# Patient Record
Sex: FEMALE | Race: ASIAN
[De-identification: names, ages, dates, MRNs, and addresses within clinical notes are randomized per-mention and may not be internally consistent; named-entity substitution may affect disease eponyms.]

---

## 2017-03-13 ENCOUNTER — RECORDS - HEALTHEAST (OUTPATIENT)
Dept: ADMINISTRATIVE | Facility: OTHER | Age: 32
End: 2017-03-13

## 2017-06-27 ENCOUNTER — COMMUNICATION - HEALTHEAST (OUTPATIENT)
Dept: FAMILY MEDICINE | Facility: CLINIC | Age: 32
End: 2017-06-27

## 2017-07-20 ENCOUNTER — OFFICE VISIT - HEALTHEAST (OUTPATIENT)
Dept: FAMILY MEDICINE | Facility: CLINIC | Age: 32
End: 2017-07-20

## 2017-07-20 DIAGNOSIS — N97.0 INFERTILITY ASSOCIATED WITH ANOVULATION: ICD-10-CM

## 2017-07-20 DIAGNOSIS — E28.2 PCOS (POLYCYSTIC OVARIAN SYNDROME): ICD-10-CM

## 2017-07-22 LAB — 17-HYDROXYPROGESTERONE, S: 47 NG/DL

## 2017-07-31 ENCOUNTER — COMMUNICATION - HEALTHEAST (OUTPATIENT)
Dept: FAMILY MEDICINE | Facility: CLINIC | Age: 32
End: 2017-07-31

## 2017-08-03 ENCOUNTER — COMMUNICATION - HEALTHEAST (OUTPATIENT)
Dept: FAMILY MEDICINE | Facility: CLINIC | Age: 32
End: 2017-08-03

## 2017-08-03 DIAGNOSIS — Z00.00 HEALTHCARE MAINTENANCE: ICD-10-CM

## 2017-08-30 ENCOUNTER — COMMUNICATION - HEALTHEAST (OUTPATIENT)
Dept: FAMILY MEDICINE | Facility: CLINIC | Age: 32
End: 2017-08-30

## 2017-09-08 ENCOUNTER — OFFICE VISIT - HEALTHEAST (OUTPATIENT)
Dept: FAMILY MEDICINE | Facility: CLINIC | Age: 32
End: 2017-09-08

## 2017-09-08 DIAGNOSIS — N92.6 MISSED MENSES: ICD-10-CM

## 2017-09-10 ENCOUNTER — COMMUNICATION - HEALTHEAST (OUTPATIENT)
Dept: FAMILY MEDICINE | Facility: CLINIC | Age: 32
End: 2017-09-10

## 2017-09-15 ENCOUNTER — COMMUNICATION - HEALTHEAST (OUTPATIENT)
Dept: SCHEDULING | Facility: CLINIC | Age: 32
End: 2017-09-15

## 2017-09-19 ENCOUNTER — COMMUNICATION - HEALTHEAST (OUTPATIENT)
Dept: SCHEDULING | Facility: CLINIC | Age: 32
End: 2017-09-19

## 2017-09-25 ENCOUNTER — COMMUNICATION - HEALTHEAST (OUTPATIENT)
Dept: SCHEDULING | Facility: CLINIC | Age: 32
End: 2017-09-25

## 2017-09-25 ENCOUNTER — OFFICE VISIT - HEALTHEAST (OUTPATIENT)
Dept: FAMILY MEDICINE | Facility: CLINIC | Age: 32
End: 2017-09-25

## 2017-09-25 DIAGNOSIS — J02.9 SORE THROAT: ICD-10-CM

## 2017-09-25 DIAGNOSIS — R05.9 COUGH: ICD-10-CM

## 2017-09-25 DIAGNOSIS — Z34.90 PREGNANT: ICD-10-CM

## 2017-09-25 ASSESSMENT — MIFFLIN-ST. JEOR: SCORE: 1358.78

## 2017-09-27 ENCOUNTER — HOSPITAL ENCOUNTER (OUTPATIENT)
Dept: ULTRASOUND IMAGING | Facility: HOSPITAL | Age: 32
Discharge: HOME | End: 2017-09-27
Attending: NURSE PRACTITIONER

## 2017-10-03 ENCOUNTER — COMMUNICATION - HEALTHEAST (OUTPATIENT)
Dept: SCHEDULING | Facility: CLINIC | Age: 32
End: 2017-10-03

## 2017-10-16 ENCOUNTER — RECORDS - HEALTHEAST (OUTPATIENT)
Dept: ADMINISTRATIVE | Facility: OTHER | Age: 32
End: 2017-10-16

## 2017-10-27 ENCOUNTER — PRENATAL OFFICE VISIT - HEALTHEAST (OUTPATIENT)
Dept: FAMILY MEDICINE | Facility: CLINIC | Age: 32
End: 2017-10-27

## 2017-10-27 DIAGNOSIS — B18.1 HEPATITIS B CARRIER (H): ICD-10-CM

## 2017-10-27 DIAGNOSIS — Z34.90 PREGNANCY: ICD-10-CM

## 2017-10-27 LAB
HBV SURFACE AG SERPL QL IA: NEGATIVE
HIV 1+2 AB+HIV1 P24 AG SERPL QL IA: NEGATIVE

## 2017-10-30 LAB
HBV CORE AB SERPL QL IA: POSITIVE
HEPATITIS B SURFACE ANTIBODY LHE- HISTORICAL: POSITIVE
HEPATITIS B SURFACE ANTIBODY LHE- HISTORICAL: POSITIVE
SYPHILIS RPR SCREEN - HISTORICAL: NORMAL

## 2017-11-26 ENCOUNTER — COMMUNICATION - HEALTHEAST (OUTPATIENT)
Dept: FAMILY MEDICINE | Facility: CLINIC | Age: 32
End: 2017-11-26

## 2017-12-01 ENCOUNTER — PRENATAL OFFICE VISIT - HEALTHEAST (OUTPATIENT)
Dept: FAMILY MEDICINE | Facility: CLINIC | Age: 32
End: 2017-12-01

## 2017-12-01 DIAGNOSIS — Z34.90 PREGNANCY: ICD-10-CM

## 2018-01-12 ENCOUNTER — COMMUNICATION - HEALTHEAST (OUTPATIENT)
Dept: FAMILY MEDICINE | Facility: CLINIC | Age: 33
End: 2018-01-12

## 2018-01-16 ENCOUNTER — COMMUNICATION - HEALTHEAST (OUTPATIENT)
Dept: FAMILY MEDICINE | Facility: CLINIC | Age: 33
End: 2018-01-16

## 2018-01-22 ENCOUNTER — COMMUNICATION - HEALTHEAST (OUTPATIENT)
Dept: FAMILY MEDICINE | Facility: CLINIC | Age: 33
End: 2018-01-22

## 2018-01-30 ENCOUNTER — COMMUNICATION - HEALTHEAST (OUTPATIENT)
Dept: FAMILY MEDICINE | Facility: CLINIC | Age: 33
End: 2018-01-30

## 2018-02-18 ENCOUNTER — COMMUNICATION - HEALTHEAST (OUTPATIENT)
Dept: FAMILY MEDICINE | Facility: CLINIC | Age: 33
End: 2018-02-18

## 2018-02-20 ENCOUNTER — COMMUNICATION - HEALTHEAST (OUTPATIENT)
Dept: OBGYN | Facility: HOSPITAL | Age: 33
End: 2018-02-20

## 2018-02-20 ENCOUNTER — COMMUNICATION - HEALTHEAST (OUTPATIENT)
Dept: FAMILY MEDICINE | Facility: CLINIC | Age: 33
End: 2018-02-20

## 2018-02-21 ENCOUNTER — COMMUNICATION - HEALTHEAST (OUTPATIENT)
Dept: FAMILY MEDICINE | Facility: CLINIC | Age: 33
End: 2018-02-21

## 2018-02-22 ENCOUNTER — PRENATAL OFFICE VISIT - HEALTHEAST (OUTPATIENT)
Dept: FAMILY MEDICINE | Facility: CLINIC | Age: 33
End: 2018-02-22

## 2018-02-22 DIAGNOSIS — N32.81 OVERACTIVE BLADDER: ICD-10-CM

## 2018-02-22 DIAGNOSIS — Z34.90 PREGNANCY: ICD-10-CM

## 2018-02-22 DIAGNOSIS — R10.32 LLQ ABDOMINAL PAIN: ICD-10-CM

## 2018-02-22 LAB
ALBUMIN UR-MCNC: ABNORMAL MG/DL
APPEARANCE UR: CLEAR
BACTERIA #/AREA URNS HPF: ABNORMAL HPF
BILIRUB UR QL STRIP: NEGATIVE
CAOX CRY #/AREA URNS HPF: PRESENT /[HPF]
COLOR UR AUTO: YELLOW
CREAT UR-MCNC: 139.9 MG/DL
FASTING STATUS PATIENT QL REPORTED: NO
GLUCOSE 1H P 50 G GLC PO SERPL-MCNC: 165 MG/DL (ref 70–139)
GLUCOSE UR STRIP-MCNC: NEGATIVE MG/DL
HGB BLD-MCNC: 11.9 G/DL (ref 12–16)
HGB UR QL STRIP: ABNORMAL
KETONES UR STRIP-MCNC: NEGATIVE MG/DL
LEUKOCYTE ESTERASE UR QL STRIP: ABNORMAL
NITRATE UR QL: NEGATIVE
PH UR STRIP: 7 [PH] (ref 5–8)
PROTEIN, RANDOM URINE - HISTORICAL: 49 MG/DL
PROTEIN/CREAT RATIO, RANDOM UR: 0.35
RBC #/AREA URNS AUTO: ABNORMAL HPF
SP GR UR STRIP: 1.02 (ref 1–1.03)
SQUAMOUS #/AREA URNS AUTO: ABNORMAL LPF
UROBILINOGEN UR STRIP-ACNC: ABNORMAL
WBC #/AREA URNS AUTO: ABNORMAL HPF

## 2018-02-23 LAB
BACTERIA SPEC CULT: NO GROWTH
SYPHILIS RPR SCREEN - HISTORICAL: NORMAL

## 2018-02-24 ENCOUNTER — AMBULATORY - HEALTHEAST (OUTPATIENT)
Dept: FAMILY MEDICINE | Facility: CLINIC | Age: 33
End: 2018-02-24

## 2018-02-24 DIAGNOSIS — Z34.90 PREGNANCY: ICD-10-CM

## 2018-02-24 DIAGNOSIS — R80.9 PROTEINURIA: ICD-10-CM

## 2018-03-02 ENCOUNTER — AMBULATORY - HEALTHEAST (OUTPATIENT)
Dept: LAB | Facility: CLINIC | Age: 33
End: 2018-03-02

## 2018-03-02 DIAGNOSIS — R80.9 PROTEINURIA: ICD-10-CM

## 2018-03-02 DIAGNOSIS — Z34.90 PREGNANCY: ICD-10-CM

## 2018-03-02 LAB
CREAT UR-MCNC: 145.2 MG/DL
FASTING STATUS PATIENT QL REPORTED: YES
GLUCOSE 1H P 100 G GLC PO SERPL-MCNC: 157 MG/DL
GLUCOSE 2H P 100 G GLC PO SERPL-MCNC: 172 MG/DL
GLUCOSE 3H P 100 G GLC PO SERPL-MCNC: 159 MG/DL
GLUCOSE P FAST SERPL-MCNC: 83 MG/DL
PROTEIN, RANDOM URINE - HISTORICAL: 26 MG/DL
PROTEIN/CREAT RATIO, RANDOM UR: 0.18

## 2018-03-05 ENCOUNTER — COMMUNICATION - HEALTHEAST (OUTPATIENT)
Dept: FAMILY MEDICINE | Facility: CLINIC | Age: 33
End: 2018-03-05

## 2018-03-05 DIAGNOSIS — O24.419 GDM (GESTATIONAL DIABETES MELLITUS): ICD-10-CM

## 2018-03-08 ENCOUNTER — COMMUNICATION - HEALTHEAST (OUTPATIENT)
Dept: FAMILY MEDICINE | Facility: CLINIC | Age: 33
End: 2018-03-08

## 2018-03-13 ENCOUNTER — OFFICE VISIT - HEALTHEAST (OUTPATIENT)
Dept: EDUCATION SERVICES | Facility: CLINIC | Age: 33
End: 2018-03-13

## 2018-03-13 DIAGNOSIS — O24.419 GESTATIONAL DIABETES: ICD-10-CM

## 2018-03-22 ENCOUNTER — PRENATAL OFFICE VISIT - HEALTHEAST (OUTPATIENT)
Dept: FAMILY MEDICINE | Facility: CLINIC | Age: 33
End: 2018-03-22

## 2018-03-22 DIAGNOSIS — R53.83 FATIGUE: ICD-10-CM

## 2018-03-22 DIAGNOSIS — Z34.90 PREGNANCY: ICD-10-CM

## 2018-03-22 DIAGNOSIS — O26.899 LOW BACK PAIN DURING PREGNANCY: ICD-10-CM

## 2018-03-22 DIAGNOSIS — O24.419 GDM (GESTATIONAL DIABETES MELLITUS): ICD-10-CM

## 2018-03-22 DIAGNOSIS — M54.50 LOW BACK PAIN DURING PREGNANCY: ICD-10-CM

## 2018-03-22 LAB
ALBUMIN UR-MCNC: ABNORMAL MG/DL
APPEARANCE UR: ABNORMAL
BACTERIA #/AREA URNS HPF: ABNORMAL HPF
BILIRUB UR QL STRIP: NEGATIVE
COLOR UR AUTO: ABNORMAL
GLUCOSE UR STRIP-MCNC: NEGATIVE MG/DL
HGB UR QL STRIP: ABNORMAL
KETONES UR STRIP-MCNC: NEGATIVE MG/DL
LEUKOCYTE ESTERASE UR QL STRIP: ABNORMAL
MUCOUS THREADS #/AREA URNS LPF: ABNORMAL LPF
NITRATE UR QL: NEGATIVE
PH UR STRIP: 7 [PH] (ref 5–8)
RBC #/AREA URNS AUTO: ABNORMAL HPF
SP GR UR STRIP: 1.02 (ref 1–1.03)
SQUAMOUS #/AREA URNS AUTO: >100 LPF
UROBILINOGEN UR STRIP-ACNC: ABNORMAL
WBC #/AREA URNS AUTO: ABNORMAL HPF

## 2018-03-23 LAB — BACTERIA SPEC CULT: NO GROWTH

## 2018-03-29 ENCOUNTER — COMMUNICATION - HEALTHEAST (OUTPATIENT)
Dept: FAMILY MEDICINE | Facility: CLINIC | Age: 33
End: 2018-03-29

## 2018-03-30 ENCOUNTER — PRENATAL OFFICE VISIT - HEALTHEAST (OUTPATIENT)
Dept: FAMILY MEDICINE | Facility: CLINIC | Age: 33
End: 2018-03-30

## 2018-03-30 DIAGNOSIS — O24.419 GDM (GESTATIONAL DIABETES MELLITUS): ICD-10-CM

## 2018-03-30 DIAGNOSIS — F41.9 ANXIOUSNESS: ICD-10-CM

## 2018-03-30 DIAGNOSIS — Z34.90 PREGNANCY: ICD-10-CM

## 2018-03-30 LAB
ALBUMIN SERPL-MCNC: 2.8 G/DL (ref 3.5–5)
ALBUMIN UR-MCNC: ABNORMAL MG/DL
ALP SERPL-CCNC: 153 U/L (ref 45–120)
ALT SERPL W P-5'-P-CCNC: 20 U/L (ref 0–45)
ANION GAP SERPL CALCULATED.3IONS-SCNC: 15 MMOL/L (ref 5–18)
APPEARANCE UR: CLEAR
AST SERPL W P-5'-P-CCNC: 19 U/L (ref 0–40)
BILIRUB SERPL-MCNC: 0.6 MG/DL (ref 0–1)
BILIRUB UR QL STRIP: NEGATIVE
BUN SERPL-MCNC: 7 MG/DL (ref 8–22)
CALCIUM SERPL-MCNC: 8.8 MG/DL (ref 8.5–10.5)
CHLORIDE BLD-SCNC: 106 MMOL/L (ref 98–107)
CO2 SERPL-SCNC: 15 MMOL/L (ref 22–31)
COLOR UR AUTO: YELLOW
CREAT SERPL-MCNC: 0.58 MG/DL (ref 0.6–1.1)
ERYTHROCYTE [DISTWIDTH] IN BLOOD BY AUTOMATED COUNT: 14.4 % (ref 11–14.5)
GFR SERPL CREATININE-BSD FRML MDRD: >60 ML/MIN/1.73M2
GLUCOSE BLD-MCNC: 155 MG/DL (ref 70–125)
GLUCOSE UR STRIP-MCNC: NEGATIVE MG/DL
HCT VFR BLD AUTO: 33.7 % (ref 35–47)
HGB BLD-MCNC: 11.7 G/DL (ref 12–16)
HGB UR QL STRIP: ABNORMAL
KETONES UR STRIP-MCNC: NEGATIVE MG/DL
LEUKOCYTE ESTERASE UR QL STRIP: ABNORMAL
MCH RBC QN AUTO: 31.6 PG (ref 27–34)
MCHC RBC AUTO-ENTMCNC: 34.8 G/DL (ref 32–36)
MCV RBC AUTO: 91 FL (ref 80–100)
NITRATE UR QL: NEGATIVE
PH UR STRIP: 7 [PH] (ref 5–8)
PLATELET # BLD AUTO: 166 THOU/UL (ref 140–440)
PMV BLD AUTO: 8.1 FL (ref 7–10)
POTASSIUM BLD-SCNC: 3.1 MMOL/L (ref 3.5–5)
PROT SERPL-MCNC: 6.4 G/DL (ref 6–8)
RBC # BLD AUTO: 3.71 MILL/UL (ref 3.8–5.4)
SODIUM SERPL-SCNC: 136 MMOL/L (ref 136–145)
SP GR UR STRIP: 1.02 (ref 1–1.03)
UROBILINOGEN UR STRIP-ACNC: ABNORMAL
WBC: 7.4 THOU/UL (ref 4–11)

## 2018-04-02 ENCOUNTER — COMMUNICATION - HEALTHEAST (OUTPATIENT)
Dept: FAMILY MEDICINE | Facility: CLINIC | Age: 33
End: 2018-04-02

## 2018-04-04 ENCOUNTER — AMBULATORY - HEALTHEAST (OUTPATIENT)
Dept: EDUCATION SERVICES | Facility: CLINIC | Age: 33
End: 2018-04-04

## 2018-04-04 ENCOUNTER — COMMUNICATION - HEALTHEAST (OUTPATIENT)
Dept: FAMILY MEDICINE | Facility: CLINIC | Age: 33
End: 2018-04-04

## 2018-04-04 DIAGNOSIS — O24.410 DIET CONTROLLED GESTATIONAL DIABETES MELLITUS (GDM) IN THIRD TRIMESTER: ICD-10-CM

## 2018-04-05 ENCOUNTER — PRENATAL OFFICE VISIT - HEALTHEAST (OUTPATIENT)
Dept: FAMILY MEDICINE | Facility: CLINIC | Age: 33
End: 2018-04-05
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
## 2018-04-05 DIAGNOSIS — Z34.90 PREGNANCY: ICD-10-CM

## 2018-04-05 DIAGNOSIS — O24.419 GDM (GESTATIONAL DIABETES MELLITUS): ICD-10-CM

## 2018-04-05 DIAGNOSIS — F41.8 SITUATIONAL ANXIETY: ICD-10-CM

## 2018-04-05 LAB
ALBUMIN UR-MCNC: ABNORMAL MG/DL
APPEARANCE UR: ABNORMAL
BILIRUB UR QL STRIP: NEGATIVE
COLOR UR AUTO: YELLOW
GLUCOSE UR STRIP-MCNC: NEGATIVE MG/DL
HGB UR QL STRIP: ABNORMAL
KETONES UR STRIP-MCNC: ABNORMAL MG/DL
LEUKOCYTE ESTERASE UR QL STRIP: ABNORMAL
NITRATE UR QL: NEGATIVE
PH UR STRIP: 6.5 [PH] (ref 5–8)
SP GR UR STRIP: 1.02 (ref 1–1.03)
UROBILINOGEN UR STRIP-ACNC: ABNORMAL

## 2018-04-11 ENCOUNTER — COMMUNICATION - HEALTHEAST (OUTPATIENT)
Dept: EDUCATION SERVICES | Facility: CLINIC | Age: 33
End: 2018-04-11

## 2018-04-13 ENCOUNTER — COMMUNICATION - HEALTHEAST (OUTPATIENT)
Dept: FAMILY MEDICINE | Facility: CLINIC | Age: 33
End: 2018-04-13

## 2018-04-18 ENCOUNTER — AMBULATORY - HEALTHEAST (OUTPATIENT)
Dept: EDUCATION SERVICES | Facility: CLINIC | Age: 33
End: 2018-04-18

## 2018-04-18 DIAGNOSIS — O24.414 INSULIN CONTROLLED GESTATIONAL DIABETES MELLITUS (GDM) IN THIRD TRIMESTER: ICD-10-CM

## 2018-04-19 ENCOUNTER — COMMUNICATION - HEALTHEAST (OUTPATIENT)
Dept: ADMINISTRATIVE | Facility: CLINIC | Age: 33
End: 2018-04-19

## 2018-04-19 ENCOUNTER — PRENATAL OFFICE VISIT - HEALTHEAST (OUTPATIENT)
Dept: FAMILY MEDICINE | Facility: CLINIC | Age: 33
End: 2018-04-19

## 2018-04-19 DIAGNOSIS — Z34.90 PREGNANCY: ICD-10-CM

## 2018-04-19 LAB
ALBUMIN SERPL-MCNC: 2.8 G/DL (ref 3.5–5)
ALBUMIN UR-MCNC: ABNORMAL MG/DL
ALP SERPL-CCNC: 225 U/L (ref 45–120)
ALT SERPL W P-5'-P-CCNC: 11 U/L (ref 0–45)
ANION GAP SERPL CALCULATED.3IONS-SCNC: 14 MMOL/L (ref 5–18)
APPEARANCE UR: CLEAR
AST SERPL W P-5'-P-CCNC: 13 U/L (ref 0–40)
BILIRUB SERPL-MCNC: 0.5 MG/DL (ref 0–1)
BILIRUB UR QL STRIP: NEGATIVE
BUN SERPL-MCNC: 7 MG/DL (ref 8–22)
CALCIUM SERPL-MCNC: 9.6 MG/DL (ref 8.5–10.5)
CHLORIDE BLD-SCNC: 111 MMOL/L (ref 98–107)
CO2 SERPL-SCNC: 16 MMOL/L (ref 22–31)
COLOR UR AUTO: YELLOW
CREAT SERPL-MCNC: 0.57 MG/DL (ref 0.6–1.1)
ERYTHROCYTE [DISTWIDTH] IN BLOOD BY AUTOMATED COUNT: 14.3 % (ref 11–14.5)
GFR SERPL CREATININE-BSD FRML MDRD: >60 ML/MIN/1.73M2
GLUCOSE BLD-MCNC: 129 MG/DL (ref 70–125)
GLUCOSE UR STRIP-MCNC: NEGATIVE MG/DL
HCT VFR BLD AUTO: 36.9 % (ref 35–47)
HGB BLD-MCNC: 12.7 G/DL (ref 12–16)
HGB UR QL STRIP: ABNORMAL
KETONES UR STRIP-MCNC: NEGATIVE MG/DL
LEUKOCYTE ESTERASE UR QL STRIP: ABNORMAL
MCH RBC QN AUTO: 31.3 PG (ref 27–34)
MCHC RBC AUTO-ENTMCNC: 34.5 G/DL (ref 32–36)
MCV RBC AUTO: 91 FL (ref 80–100)
NITRATE UR QL: NEGATIVE
PH UR STRIP: 7 [PH] (ref 5–8)
PLATELET # BLD AUTO: 181 THOU/UL (ref 140–440)
PMV BLD AUTO: 8.5 FL (ref 7–10)
POTASSIUM BLD-SCNC: 3.4 MMOL/L (ref 3.5–5)
PROT SERPL-MCNC: 6.9 G/DL (ref 6–8)
RBC # BLD AUTO: 4.07 MILL/UL (ref 3.8–5.4)
SODIUM SERPL-SCNC: 141 MMOL/L (ref 136–145)
SP GR UR STRIP: 1.02 (ref 1–1.03)
UROBILINOGEN UR STRIP-ACNC: ABNORMAL
WBC: 8 THOU/UL (ref 4–11)

## 2018-04-20 ENCOUNTER — AMBULATORY - HEALTHEAST (OUTPATIENT)
Dept: FAMILY MEDICINE | Facility: CLINIC | Age: 33
End: 2018-04-20

## 2018-04-20 ENCOUNTER — COMMUNICATION - HEALTHEAST (OUTPATIENT)
Dept: FAMILY MEDICINE | Facility: CLINIC | Age: 33
End: 2018-04-20

## 2018-04-20 ENCOUNTER — AMBULATORY - HEALTHEAST (OUTPATIENT)
Dept: ENDOCRINOLOGY | Facility: CLINIC | Age: 33
End: 2018-04-20

## 2018-04-20 DIAGNOSIS — O24.419 GDM (GESTATIONAL DIABETES MELLITUS): ICD-10-CM

## 2018-04-20 DIAGNOSIS — O41.00X0 OLIGOHYDRAMNIOS: ICD-10-CM

## 2018-04-20 DIAGNOSIS — O24.415 GESTATIONAL DIABETES MELLITUS (GDM) IN THIRD TRIMESTER CONTROLLED ON ORAL HYPOGLYCEMIC DRUG: ICD-10-CM

## 2018-04-20 LAB — BACTERIA SPEC CULT: NO GROWTH

## 2018-04-21 LAB
ALLERGIC TO PENICILLIN: NO
GP B STREP DNA SPEC QL NAA+PROBE: POSITIVE

## 2018-04-23 ENCOUNTER — COMMUNICATION - HEALTHEAST (OUTPATIENT)
Dept: FAMILY MEDICINE | Facility: CLINIC | Age: 33
End: 2018-04-23

## 2018-04-23 ENCOUNTER — PRENATAL OFFICE VISIT - HEALTHEAST (OUTPATIENT)
Dept: FAMILY MEDICINE | Facility: CLINIC | Age: 33
End: 2018-04-23

## 2018-04-23 DIAGNOSIS — Z34.90 PREGNANCY: ICD-10-CM

## 2018-04-23 DIAGNOSIS — O24.415 GESTATIONAL DIABETES MELLITUS (GDM) IN THIRD TRIMESTER CONTROLLED ON ORAL HYPOGLYCEMIC DRUG: ICD-10-CM

## 2018-04-23 LAB
ALBUMIN UR-MCNC: NEGATIVE MG/DL
APPEARANCE UR: ABNORMAL
BILIRUB UR QL STRIP: NEGATIVE
COLOR UR AUTO: YELLOW
GLUCOSE UR STRIP-MCNC: NEGATIVE MG/DL
HGB UR QL STRIP: ABNORMAL
KETONES UR STRIP-MCNC: NEGATIVE MG/DL
LEUKOCYTE ESTERASE UR QL STRIP: ABNORMAL
NITRATE UR QL: NEGATIVE
PH UR STRIP: 7 [PH] (ref 5–8)
SP GR UR STRIP: 1.02 (ref 1–1.03)
UROBILINOGEN UR STRIP-ACNC: ABNORMAL

## 2018-04-30 ENCOUNTER — COMMUNICATION - HEALTHEAST (OUTPATIENT)
Dept: OBGYN | Facility: HOSPITAL | Age: 33
End: 2018-04-30

## 2018-05-02 ENCOUNTER — COMMUNICATION - HEALTHEAST (OUTPATIENT)
Dept: FAMILY MEDICINE | Facility: CLINIC | Age: 33
End: 2018-05-02

## 2018-05-02 ENCOUNTER — COMMUNICATION - HEALTHEAST (OUTPATIENT)
Dept: SCHEDULING | Facility: CLINIC | Age: 33
End: 2018-05-02

## 2018-05-03 ENCOUNTER — RECORDS - HEALTHEAST (OUTPATIENT)
Dept: ADMINISTRATIVE | Facility: OTHER | Age: 33
End: 2018-05-03

## 2018-05-04 ENCOUNTER — RECORDS - HEALTHEAST (OUTPATIENT)
Dept: ADMINISTRATIVE | Facility: OTHER | Age: 33
End: 2018-05-04

## 2018-05-10 ENCOUNTER — OFFICE VISIT - HEALTHEAST (OUTPATIENT)
Dept: FAMILY MEDICINE | Facility: CLINIC | Age: 33
End: 2018-05-10

## 2018-05-10 DIAGNOSIS — R10.9 ABDOMINAL PAIN: ICD-10-CM

## 2018-05-10 DIAGNOSIS — R05.9 COUGH: ICD-10-CM

## 2018-05-10 DIAGNOSIS — R01.1 HEART MURMUR: ICD-10-CM

## 2018-05-10 DIAGNOSIS — N83.202 HEMORRHAGIC CYST OF LEFT OVARY: ICD-10-CM

## 2018-05-10 DIAGNOSIS — O24.415 GESTATIONAL DIABETES MELLITUS (GDM) IN THIRD TRIMESTER CONTROLLED ON ORAL HYPOGLYCEMIC DRUG: ICD-10-CM

## 2018-05-10 DIAGNOSIS — Z09 HOSPITAL DISCHARGE FOLLOW-UP: ICD-10-CM

## 2018-05-17 ENCOUNTER — COMMUNICATION - HEALTHEAST (OUTPATIENT)
Dept: TELEHEALTH | Facility: CLINIC | Age: 33
End: 2018-05-17

## 2018-05-17 ENCOUNTER — COMMUNICATION - HEALTHEAST (OUTPATIENT)
Dept: HEALTH INFORMATION MANAGEMENT | Facility: CLINIC | Age: 33
End: 2018-05-17

## 2018-06-29 ENCOUNTER — OFFICE VISIT - HEALTHEAST (OUTPATIENT)
Dept: FAMILY MEDICINE | Facility: CLINIC | Age: 33
End: 2018-06-29

## 2018-06-29 DIAGNOSIS — N91.2 AMENORRHEA: ICD-10-CM

## 2018-06-29 DIAGNOSIS — O24.415 GESTATIONAL DIABETES MELLITUS (GDM) IN THIRD TRIMESTER CONTROLLED ON ORAL HYPOGLYCEMIC DRUG: ICD-10-CM

## 2018-06-29 LAB
HBA1C MFR BLD: 5.3 % (ref 3.5–6)
HCG UR QL: NEGATIVE
HGB BLD-MCNC: 13.7 G/DL (ref 12–16)
SP GR UR STRIP: 1.02 (ref 1–1.03)

## 2018-06-29 ASSESSMENT — MIFFLIN-ST. JEOR: SCORE: 1327.77

## 2018-09-26 ENCOUNTER — COMMUNICATION - HEALTHEAST (OUTPATIENT)
Dept: FAMILY MEDICINE | Facility: CLINIC | Age: 33
End: 2018-09-26

## 2018-09-27 ENCOUNTER — COMMUNICATION - HEALTHEAST (OUTPATIENT)
Dept: FAMILY MEDICINE | Facility: CLINIC | Age: 33
End: 2018-09-27

## 2018-09-27 RX ORDER — METFORMIN HCL 500 MG
500 TABLET, EXTENDED RELEASE 24 HR ORAL
Qty: 30 TABLET | Refills: 11 | Status: SHIPPED | OUTPATIENT
Start: 2018-09-27

## 2018-10-02 ENCOUNTER — OFFICE VISIT - HEALTHEAST (OUTPATIENT)
Dept: FAMILY MEDICINE | Facility: CLINIC | Age: 33
End: 2018-10-02

## 2018-10-02 DIAGNOSIS — Z31.69 INFERTILITY COUNSELING: ICD-10-CM

## 2018-10-02 DIAGNOSIS — B18.1 HEPATITIS B CARRIER (H): ICD-10-CM

## 2018-10-02 DIAGNOSIS — N97.0 INFERTILITY, ANOVULATION: ICD-10-CM

## 2018-10-02 DIAGNOSIS — Z86.32 HISTORY OF GESTATIONAL DIABETES: ICD-10-CM

## 2018-10-02 DIAGNOSIS — B07.8 COMMON WART: ICD-10-CM

## 2018-10-02 LAB
ALBUMIN SERPL-MCNC: 4 G/DL (ref 3.5–5)
ALP SERPL-CCNC: 88 U/L (ref 45–120)
ALT SERPL W P-5'-P-CCNC: 25 U/L (ref 0–45)
ANION GAP SERPL CALCULATED.3IONS-SCNC: 11 MMOL/L (ref 5–18)
AST SERPL W P-5'-P-CCNC: 19 U/L (ref 0–40)
BILIRUB SERPL-MCNC: 0.7 MG/DL (ref 0–1)
BUN SERPL-MCNC: 13 MG/DL (ref 8–22)
CALCIUM SERPL-MCNC: 9.6 MG/DL (ref 8.5–10.5)
CHLORIDE BLD-SCNC: 108 MMOL/L (ref 98–107)
CO2 SERPL-SCNC: 21 MMOL/L (ref 22–31)
CREAT SERPL-MCNC: 0.62 MG/DL (ref 0.6–1.1)
GFR SERPL CREATININE-BSD FRML MDRD: >60 ML/MIN/1.73M2
GLUCOSE BLD-MCNC: 104 MG/DL (ref 70–125)
HBA1C MFR BLD: 5.2 % (ref 3.5–6)
POTASSIUM BLD-SCNC: 3.6 MMOL/L (ref 3.5–5)
PROT SERPL-MCNC: 7.9 G/DL (ref 6–8)
SODIUM SERPL-SCNC: 140 MMOL/L (ref 136–145)

## 2018-10-02 RX ORDER — SYRINGE AND NEEDLE,INSULIN,1ML 30 GX5/16"
1 SYRINGE, EMPTY DISPOSABLE MISCELLANEOUS DAILY
Qty: 60 EACH | Refills: 11 | Status: SHIPPED | OUTPATIENT
Start: 2018-10-02

## 2018-10-06 LAB
SHBG SERPL-SCNC: 37 NMOL/L (ref 30–135)
TESTOST FREE SERPL-MCNC: 0.99 NG/DL (ref 0.08–0.74)
TESTOST SERPL-MCNC: 59 NG/DL (ref 8–60)

## 2018-10-26 ENCOUNTER — OFFICE VISIT - HEALTHEAST (OUTPATIENT)
Dept: FAMILY MEDICINE | Facility: CLINIC | Age: 33
End: 2018-10-26

## 2018-10-26 DIAGNOSIS — B07.8 COMMON WART: ICD-10-CM

## 2018-12-20 ENCOUNTER — COMMUNICATION - HEALTHEAST (OUTPATIENT)
Dept: FAMILY MEDICINE | Facility: CLINIC | Age: 33
End: 2018-12-20

## 2019-07-23 ENCOUNTER — COMMUNICATION - HEALTHEAST (OUTPATIENT)
Dept: FAMILY MEDICINE | Facility: CLINIC | Age: 34
End: 2019-07-23

## 2019-07-31 ENCOUNTER — COMMUNICATION - HEALTHEAST (OUTPATIENT)
Dept: FAMILY MEDICINE | Facility: CLINIC | Age: 34
End: 2019-07-31

## 2019-11-03 ENCOUNTER — AMBULATORY - HEALTHEAST (OUTPATIENT)
Dept: NURSING | Facility: CLINIC | Age: 34
End: 2019-11-03

## 2021-04-26 ENCOUNTER — COMMUNICATION - HEALTHEAST (OUTPATIENT)
Dept: CARDIOLOGY | Facility: CLINIC | Age: 36
End: 2021-04-26

## 2021-05-30 ENCOUNTER — RECORDS - HEALTHEAST (OUTPATIENT)
Dept: ADMINISTRATIVE | Facility: CLINIC | Age: 36
End: 2021-05-30

## 2021-05-31 VITALS — WEIGHT: 162 LBS | BODY MASS INDEX: 31.64 KG/M2

## 2021-05-31 VITALS — BODY MASS INDEX: 31.36 KG/M2 | WEIGHT: 160.56 LBS

## 2021-05-31 VITALS — BODY MASS INDEX: 32.63 KG/M2 | WEIGHT: 167.1 LBS

## 2021-05-31 VITALS — HEIGHT: 60 IN | WEIGHT: 161.44 LBS | BODY MASS INDEX: 31.7 KG/M2

## 2021-06-01 ENCOUNTER — RECORDS - HEALTHEAST (OUTPATIENT)
Dept: ADMINISTRATIVE | Facility: CLINIC | Age: 36
End: 2021-06-01

## 2021-06-01 VITALS — BODY MASS INDEX: 33.96 KG/M2 | WEIGHT: 173.9 LBS

## 2021-06-01 VITALS — BODY MASS INDEX: 31.87 KG/M2 | HEIGHT: 59 IN | WEIGHT: 158.1 LBS

## 2021-06-01 VITALS — WEIGHT: 174.9 LBS | BODY MASS INDEX: 34.16 KG/M2

## 2021-06-01 VITALS — BODY MASS INDEX: 32.54 KG/M2 | WEIGHT: 161.1 LBS

## 2021-06-01 VITALS — BODY MASS INDEX: 34 KG/M2 | WEIGHT: 174.1 LBS

## 2021-06-01 VITALS — BODY MASS INDEX: 34.12 KG/M2 | WEIGHT: 174.7 LBS

## 2021-06-01 VITALS — WEIGHT: 174 LBS | BODY MASS INDEX: 33.98 KG/M2

## 2021-06-01 VITALS — WEIGHT: 171.6 LBS | BODY MASS INDEX: 33.51 KG/M2

## 2021-06-01 VITALS — WEIGHT: 176.4 LBS | BODY MASS INDEX: 34.45 KG/M2

## 2021-06-01 VITALS — BODY MASS INDEX: 33.81 KG/M2 | WEIGHT: 173.1 LBS

## 2021-06-02 ENCOUNTER — RECORDS - HEALTHEAST (OUTPATIENT)
Dept: ADMINISTRATIVE | Facility: CLINIC | Age: 36
End: 2021-06-02

## 2021-06-02 VITALS — BODY MASS INDEX: 33.99 KG/M2 | WEIGHT: 168.3 LBS

## 2021-06-12 NOTE — PROGRESS NOTES
ASSESSMENT & PLAN:  1. Infertility associated with anovulation/ PCOS  -When I had seen patient a year ago we had already undergone initial lab testing for infertility and labs specific to PCO S.  Results were not definitive and ultrasound was fairly normal but did show multiple follicles.  Since I was going out on leave I did have patient evaluated later in the year by OB/GYN who determined that patient most likely does fit the picture of polycystic ovarian syndrome and I had had earlier discussions regarding obesity and how that could be playing a role given that she has been able to get pregnant several times prior.  She has been unsuccessful with weight loss.  In the past was started on metformin but only used for a few weeks.  We discussed that perhaps she would tolerate it better if we did the extended release version and would like her to reinitiate this to induce her help with ovulation.  She did not want to wait however for the medication to kick in and is interested in Clomid therapy.  I explained to her the risks associated with the medication and she would like to proceed with attempted induction of ovulation.  She has been successful with a withdrawal bleed in the past and that has been her only episode of menses was after a trial of medroxyprogesterone.  Patient is to follow the instructions as noted below and follow-up within a couple months.  She is to let me know if she does not obtain withdrawal bleed because if she does not she is to not start the Clomid therapy  - 17-Hydroxyprogesterone  - Beta-hCG, Quantitative  - Follicle Stimulating Hormone (FSH)  - Estradiol      Patient Instructions   I would like you to start taking metformin 1 tablet by mouth once daily-sometimes tolerated better with food and can be with supper if this is best for you.  After a week if you are tolerating the medication I would like you to increase it to 1 tablet twice daily.  We may even go up on the dose from there.   Please let me know if you are not tolerating the medication or if it is quite expensive since I have called in the extended release version for you.      For your Clomid induction:  - I would like you to take the medroxyprogesterone 10 mg once daily for 5 days.   Once you start bleeding we will call that day 1 of your cycle.  -I would like you to start the Clomid day 4 or 5 after you start bleeding.  you will take this once daily for 5 days meanwhile continuing your other medications  --I would like you to purchase ovulation test strips and start checking your urine-days 5 through 12 after you have completed the Clomid.    If you get a positive result you will be ovulating within that time and you should have intercourse also please let me know please let me know if you do not have a withdrawal bleed after the medroxyprogesterone because I would not start the Clomid if you have not    -Regardless please let me know if you do get a positive ovulation test and I may pull you back in for follow-up lab testing.  If you do not ovulate we may increase the dose of your Clomid. follow-up with me In 2 months       Orders Placed This Encounter   Procedures     17-Hydroxyprogesterone     Beta-hCG, Quantitative     Follicle Stimulating Hormone (FSH)     Estradiol     Medications Discontinued During This Encounter   Medication Reason     codeine-guaiFENesin (GUAIFENESIN AC)  mg/5 mL liquid Therapy completed     medroxyPROGESTERone (PROVERA) 10 MG tablet Reorder       No Follow-up on file.     CHIEF COMPLAINT:  Chief Complaint   Patient presents with     Menstrual Problem        HISTORY OF PRESENT ILLNESS:  Stef is a 31 y.o. female presenting to the clinic today for evaluation of her amenorrhea. She was not having a menstrual period last year so she had lab work done as well as a pelvic ultrasound; Her ultrasound from 12/5/2016 revealed multiple small peripherally distributed follicles of the right ovary but her lab work  from 6/21/2016 was unremarkable. She did have a withdrawal bleed after a medroxyprogesterone injection; she bled for 3-5 days and she has not had a menstrual period since then. She was seen by [facility] on 12/14/2016, but they wanted to do a lot of labs and she has a high-deductible insurance plan. She has been trying to get pregnant for at least 2 years now. She has not been tracking ovulation or testing for ovulation. She did exercise and try to eat healthier, but she has not been working on weight loss lately. She took metformin for about 1 month and then she stopped taking it; the metformin made her sensitive to smell and caused her to feel sick. She had a miscarriage when she was very young at 1-2 months gestation. She had been getting regular periods until about 3 years ago. She was taking birth control before she had her youngest son and she had periods after she stopped that medication. Her youngest is 7 years old now. She was about 20 pounds lighter when she was pregnant last. She bought some OTC medications at a [employer] market once; they were diet pills and she stopped taking them after a few days because she was noticing strange vaginal spotting. She does not know what was in the pills she bought. She would like to conceive as soon as possible, so she would like to start Clomid. She would like to have 3-4 more children, if possible. She has been having intercourse regularly.     REVIEW OF SYSTEMS:   She has always been a very hairy person. All other systems are negative.     PFSH:    TOBACCO USE:  History   Smoking Status     Never Smoker   Smokeless Tobacco     Not on file        VITALS:  Vitals:    07/20/17 1309   BP: 114/74   Patient Site: Right Arm   Patient Position: Sitting   Cuff Size: Adult Large   Pulse: 66   Resp: 16   Weight: 160 lb 9 oz (72.8 kg)     Wt Readings from Last 3 Encounters:   07/20/17 160 lb 9 oz (72.8 kg)   09/25/16 155 lb 9.6 oz (70.6 kg)   06/21/16 154 lb (69.9 kg)     Body  mass index is 31.36 kg/(m^2).  No LMP recorded.      PHYSICAL EXAM:  GENERAL:  Reveals an alert 31 y.o. female in NAD.  Vitals:  Per nursing notes.  EYES: PERRLA. Extraocular movements intact. Normal conjunctiva and lids.   NECK:  Supple, thyroid not palpable.  CARDIAC:  Regular rate and rhythm without murmurs, rubs, or gallops. Legs without edema. Carotids without bruits.   LUNGS: Clear.  Respiratory effort normal.  ABDOMEN:  Soft, non-tender, without hepatosplenomegaly, masses, or hernias.   SKIN:   Without rash, bruise, or palpable lesions.  NEURO:  Cranial nerves II-XII intact.     PSYCH:  Mood appropriate, memory intact.      QUALITY MEASURES:  The following high BMI interventions were performed this visit: encouragement to exercise and lifestyle education regarding diet    DATA REVIEWED:  ADDITIONAL HISTORY SUMMARIZED (2): Reviewed [facility] note 12/14/2016.   DECISION TO OBTAIN EXTRA INFORMATION (1): None.   RADIOLOGY TESTS (1): Reviewed pelvic US report 12/5/2016.  LABS (1): Reviewed labs 6/21/2016 and ordered labs.  MEDICINE TESTS (1): None.  INDEPENDENT REVIEW (2 each): None.     The visit lasted a total of 27 minutes face to face with the patient. Over 50% of the time was spent counseling and educating the patient about diet, exercise, preconception, and amenorrhea.     I, Kisha Forbes, am scribing for and in the presence of Dr. Wilkes.  Poonam DUNNE DO , personally performed the services described in this documentation, as scribed by Kisha Forbes in my presence, and it is both accurate and complete.     MEDICATIONS:  Current Outpatient Prescriptions   Medication Sig Dispense Refill     clomiPHENE (CLOMID) 50 mg tablet Take 1 tablet (50 mg total) by mouth daily. 5 tablet 0     medroxyPROGESTERone (PROVERA) 10 MG tablet Take 1 tablet (10 mg total) by mouth daily. 5 tablet 0     metFORMIN (GLUCOPHAGE XR) 500 MG 24 hr tablet Take 1 tablet by mouth twice daily 60 tablet 11     No current  facility-administered medications for this visit.         Total data points: 4

## 2021-06-13 NOTE — PROGRESS NOTES
Assessment / Impression     1. Cough     2. Sore throat  Rapid Strep A Screen-Throat    Group A Strep, RNA Direct Detection, Throat   3. Pregnant           Plan:     Her symptoms appear to be due to a viral upper respiratory infection.  I recommended symptomatic cares.  Our options are somewhat limited since she is pregnant.  Her primary concern is regarding her frequent coughing symptoms.  She may try cough drops and honey.  She may also try Robitussin without DM.  I encouraged rest and hydration.  If her symptoms become worse she may return to the clinic.  Rapid strep test is negative.  We are sending this for culture.    Subjective:      HPI: Stef Hathaway is a 31 y.o. female who resents to the clinic a sore throat with frequent coughing symptoms over the past week.  She describes having chills and has felt a little short of breath when coughing.  She denies chest congestion.  Her symptoms are worse at night.  She is approximately 8 weeks pregnant.  She reports going to an urgent care yesterday, but they did not prescribe anything.  Her children have been ill with similar symptoms.      Review of Systems  Pertinent items are noted in HPI.       Objective:     /64 (Patient Site: Left Arm, Patient Position: Sitting, Cuff Size: Adult Regular)  Pulse 84  Temp 98.2  F (36.8  C) (Oral)   Resp 16  Ht 5' (1.524 m)  Wt 161 lb 7 oz (73.2 kg)  LMP 07/31/2017 (Exact Date)  SpO2 99% Comment: RA  Breastfeeding? No  BMI 31.53 kg/m2    General Appearance: Alert, cooperative, appears slightly fatigued.  Head: Normocephalic, without obvious abnormality, atraumatic.  Eyes: PERRL, conjunctiva/corneas clear, EOM's intact.  Ears: Normal TM's and external ear canals, both ears.  Nose: Nares congested.  Throat: Slightly erythematous. No exudates.  Neck: Supple, symmetrical, trachea midline, no lymphadenopathy.  Lungs: Clear to auscultation bilaterally, respirations unlabored.  Coughs frequently during the exam  Heart::  Regular rate and rhythm.  Extremities: Extremities normal, atraumatic, no cyanosis or edema.        Recent Results (from the past 168 hour(s))   Rapid Strep A Screen-Throat   Result Value Ref Range    Rapid Strep A Antigen No Group A Strep detected, presumptive negative No Group A Strep detected, presumptive negative

## 2021-06-13 NOTE — PROGRESS NOTES
PRENATAL VISIT   FIRST OBSTETRICAL EXAM - OB    Assessment / Impression and PLAN      - 5 para 3 uncomplicated pregnancy with no risk factors at this time.  Conceived with Clomid therapy and has had mild nausea which we reviewed ways to treat that with eating small meals higher in protein throughout the day.  Initial ultrasound showed viability at 7 weeks and 5 days sanchez pregnancy.  I was not able to obtain heart tones today which I reassured patient that I am not too concerned about but we would obtain another ultrasound for viability.  She was given the phone number to schedule her ultrasound.  Normal first prenatal visit at 12 weeks gestation.   Discussed orientation, general information, lifestyle, nutrition, exercise,warning signs, resources, lab testing, risk screening and discussed cystic fibrosis screening with patient.  Questions answered.  -Seems to have recovered well from her bronchitis.  Continue prenatal vitamin  Given her history of-hepatitis B carrier the last time I drew surface antigen that was negative.  I decided to draw additional labs for chronic hepatitis B evaluation.     Initial labs drawn.  Prenatal vitamins.  Problem list reviewed and updated.  Genetic screening test options discussed:  Patient elects to decline all testing  Role of ultrasound in pregnancy discussed; fetal survey: at 20w  Follow up: 4 weeks     Subjective:    Stef Hathaway is a 31 y.o.  here today for her First Obstetrical Exam. Her 's name is Ser. She has been pregnant 5 times now including this pregnancy; she has had one miscarriage and 3 vaginal deliveries, and she has 3 living children. She has morning sickness and a cough from that; her morning sickness is bad, but she has not vomited. She has been getting very sleepy. She works from home so she can rest when she needs to. She was thinking about breastfeeding this baby, but she read an article that said she would need to drink a lot of water, and she  does not drink enough water; she might try breastfeeding. She tried breastfeeding but was not patient enough to continue trying when she was younger. She was 17 when she had her first baby; all her children have the same father. She does not need anything for the nausea at this time. Her children's ages are 13, 11, and 7. She had her miscarriage before she had her first child. She used Clomid prior to this pregnancy. She has been taking a prenatal vitamin. She did not do genetic screening with her other children, so she thinks she will decline genetic testing. Her daughter had an issue with her meconium; getting an epidural slowed her labor. When her son came out, he was dry, so she knows she didn't drink enough water; she was told the placenta had not been working for about 1 week because it looked shriveled. She was induced 4 days early with her third baby for low SATNAM. She has failed the 1 hour glucose challenge before but she passed the 3 hour. She has never needed a vacuum assisted delivery, or any sutures for perineal tearing. She has had small tears which healed by themselves. She has been sexually active and she does not need to use lubrication. She has been told her cervix is tilted before. She would like to wait another month before considering getting a flu shot. She didn't feel nauseous with Clomid. When she coughed this morning, she felt pain in her lower abdomen. She denies any history of STD's. Her past medical history reveals she also had a second trimester  in 2007.     Hepatitis B Carrier: She is a hepatitis B carrier, and all three of her kids had the vaccine when they were born. She does not think she had a hepatitis B infection, she just found out during her first pregnancy that she is a carrier for hepatitis B. She has never been treated for hepatitis B; she has followed up, and she is not at a concerning stage requiring treatment. Her vaccination record has the vaccine listed as  something she had as a child, but she is not certain if she had it. Her  is not a carrier for hepatitis B.     Urinary Frequency: She feels like she cannot hold her urine very long; even if she drinks a small amount, she needs to urinate right away. This has been going on for a long time, and it has been getting worse over time. She likes to drink soda and she doesn't drink enough water. She usually drinks a small amount of soda from a cup when she drinks soda, so she doesn't think she drinks a whole can of soda per day. She read an article that said she could have other problems if she doesn't drink enough water, so she is wondering if her kidney function is normal. Her sisters can hold their urine for much longer than she can.     Recent Cough: Her cough finally resolved after about a month; she went to urgent care and saw a different doctor, but she didn't get any medication. Her cough came back about 2 weeks ago, so she went to the ED where she was prescribed antibiotics and an inhaler; she is feeling better now. She used the inhaler for 2-3 days. The illness went around her family, but she had it for the longest. She had been coughing up a lot of mucus. She bought plain Robitussin OTC for her cough and Tylenol for her headaches during the day.     Review of Systems:   She denies any dysuria, problems with intercourse, and she did not have any yeast infection symptoms after taking antibiotics. All other systems are negative.     OB History    Para Term  AB Living   5 3 3  1 3   SAB TAB Ectopic Multiple Live Births   1    3      # Outcome Date GA Lbr Marco/2nd Weight Sex Delivery Anes PTL Lv   5 Current            4 Term 03/12/10 39w3d    INDUCTION   CHRISTAL      Complications: SATNAM (amniotic fluid index) borderline low   3 Term 06    M    CHRISTAL   2 Term 10/30/04    F    CHRISTAL   1 SAB 10/07/02     SAB             Expected Date of Delivery: 2018 per early ultrasound.     Past Medical History:    Diagnosis Date      in second trimester 2007      (normal spontaneous vaginal delivery) 10/30/2004    Honey      (normal spontaneous vaginal delivery) 2006    Suraj      (normal spontaneous vaginal delivery) 2010     Placenta previa, marginal 2010     SAB (spontaneous ) 10/07/2002     History reviewed. No pertinent surgical history.  Social History   Substance Use Topics     Smoking status: Never Smoker     Smokeless tobacco: None     Alcohol use No     Current Outpatient Prescriptions   Medication Sig Dispense Refill     prenatal 95-iron fum-folic-dha (PRENATAL + DHA) 28 mg iron-800 mcg-200 mg Cmpk Take 1 tablet by mouth daily. 120 each 11     No current facility-administered medications for this visit.      Allergies   Allergen Reactions     Ibuprofen Hives             High Risk Behavior: None     Objective:     Vitals:    10/27/17 1233   BP: 98/60   Pulse: 86   Resp: 18   Temp: 98.1  F (36.7  C)   TempSrc: Oral   Weight: 162 lb (73.5 kg)     Physical Exam:  GENERAL:  Reveals an alert female in NAD.  Vitals:  Per nursing notes.  EYES: PERRLA. Extraocular movements intact. Normal conjunctiva and lids.   ENT:  Hearing grossly normal.  Normal appearance to ears and nose.  Bilateral TM s, external canals, oropharynx normal. Normal lips, gums and teeth.  Normal nasal mucosa, septum and turbinates.  NECK:  Supple, without thyromegaly or mass.  LUNGS:  Clear to auscultation without crackles, wheezes or distress.  Normal respiratory effort.   CV:  Regular rate and rhythm without murmurs, rubs or gallops. No varicosities or edema. Carotids without bruits.   ABDOMEN:  Soft, non-tender, without organomegaly, masses, or hernias.   BREASTS:  Non-tender, without masses, nipple discharge, erythema, or axillary adenopathy.    :  Normal pelvic exam, including external genitalia with normal appearance and no lesions. Normal vaginal exam, including no discharge and normal pelvic  support, and no lesions. Cervix well visualized, with normal appearance and no lesions or discharge.  Normal uterus, including normal size, shape, mobility with no tenderness. Normal adnexa, including no nodules, masses or tenderness. Posterior cervix   LYMPH: Normal palpation of neck, groin and axilla. No lymphadenopathy. No bruising.  NEURO:  CN II-XII intact.   PSYCH:  Alert & oriented with normal mood and affect.   SKIN:  Normal inspection and palpation.  MSK: Normal gait and station. Normal inspection, ROM, stability and strength: Spine, Head, Neck, Upper and Lower Extremities.      Lab:   Results for orders placed or performed in visit on 10/27/17   Urinalysis Macroscopic   Result Value Ref Range    Color, UA Other (!) Colorless, Yellow, Straw, Light Yellow    Clarity, UA Clear Clear    Glucose, UA Negative Negative    Bilirubin, UA Small (!) Negative    Ketones, UA Trace (!) Negative    Specific Gravity, UA 1.025 1.005 - 1.030    Blood, UA Moderate (!) Negative    pH, UA 6.0 5.0 - 8.0    Protein, UA 30 mg/dL (!) Negative mg/dL    Urobilinogen, UA 1.0 E.U./dL 0.2 E.U./dL, 1.0 E.U./dL    Nitrite, UA Negative Negative    Leukocytes, UA Small (!) Negative         The visit lasted a total of 24 minutes face to face with the patient. Over 50% of the time was spent counseling and educating the patient about routine prenatal care and her OB history.    I, Kisha Forbes, am scribing for and in the presence of Dr. Wilkes.  I, Dr. Poonam Wilkes DO, personally performed the services described in this documentation as scribed by Kisha Forbes in my presence, and it is both accurate and complete.

## 2021-06-14 NOTE — PROGRESS NOTES
Assessment & Plan:  6para 3 pregnancy -normal examination today.  No spotting or vaginal concerns.  Feeling baby movement.  Recommend fetal survey at 20 weeks and order was placed.  Follow-up in 4 weeks sooner if any concerns    Subjective: Stef presents to the clinic today with her  for a routine OB visit. She is feeling a lot better now; she started feeling baby movement. She urinates more frequently now. She is wondering when she can get her 20 week fetal survey ultrasound; her last ultrasound was done at [facility] and she would like to go there again; [facility] didn't print her ultrasounds on film like she wanted. She has two boys and one girl at home; her  thinks baby might be a girl. She works from home now; she used to go to [employer] for lunch. She is starting to feel leg cramps. She would like a prescription for prenatal vitamins; she has been taking a prenatal. She denies any dizziness, lightheadedness, or dysuria.     Objective: See above.    The visit lasted a total of 8 minutes face to face with the patient. Over 50% of the time was spent counseling and educating the patient about routine prenatal care.    I, Kisha Forbes, am scribing for and in the presence of Dr. Wilkes.  I, Dr. Poonam Wilkes DO , personally performed the services described in this documentation as scribed by Kisha Forbes in my presence, and it is both accurate and complete.

## 2021-06-16 PROBLEM — F41.8 SITUATIONAL ANXIETY: Status: ACTIVE | Noted: 2018-04-05

## 2021-06-16 PROBLEM — B07.8 COMMON WART: Status: ACTIVE | Noted: 2018-10-02

## 2021-06-16 PROBLEM — N97.0 INFERTILITY, ANOVULATION: Status: ACTIVE | Noted: 2018-10-02

## 2021-06-16 PROBLEM — O24.419 GDM (GESTATIONAL DIABETES MELLITUS): Status: ACTIVE | Noted: 2018-03-23

## 2021-06-16 PROBLEM — B18.1 HEPATITIS B CARRIER (H): Status: ACTIVE | Noted: 2018-10-05

## 2021-06-16 NOTE — PROGRESS NOTES
"Assessment & Plan:  6 para 3 pregnancy   Finch was seen today for routine prenatal visit.    Diagnoses and all orders for this visit:    Pregnancy  -     Support lumbosacral med 32-36\" (HE item #85822)  -     US Biophysical Profile W Est Fetal Weight  -     Urinalysis-UC if Indicated  -     Culture, Urine    GDM (gestational diabetes mellitus)  -     US Biophysical Profile W Est Fetal Weight    Low back pain during pregnancy    Fatigue    Other orders  -     Cancel: Tdap vaccine,  6yo or older,  IM    -Patient presents for return OB visit now with gestational diabetes.  Unfortunately she is not checking blood sugars as she was instructed to doing because does not want to see the higher numbers and is afraid of going on insulin.  We discussed that is all the more reason that we need to be reviewing her blood sugars and discussed the possible complications of having inadequately controlled gestational diabetes.  She voiced understanding and is going to start checking more regularly.  She missed her diabetic appointment this week because had a canceled due to work.  We discussed that if we needed to write a letter on behalf of her work that she is going to be missing weekly due to important office appointments we are happy to do that.  One issue is that she is doing a lot of nighttime eating.  As a result she is not sleeping well and is feeling very fatigued during the day which likely is from having higher blood sugar readings which we discussed.  We tried to discuss strategies for minimizing these habits.  She is to schedule follow-up appointment with diabetic educator and I would like to see her weekly.  I think she should be due for a biophysical to assess growth and also amniotic fluids and she has a history of low SATNAM and is measuring a little small today.  -She is having some issues with low back discomfort that I treated with osteopathic manual therapy today.  Patient tolerated well.  She was also given a " back brace and also instructed to register for [place]'s labor and delivery  -We also discussed her reflux.  She is declining ranitidine yet at this time.  Discussed avoiding late night eating.  32 weeks: MD visit with UA and BP check  34 weeks: MD visit with cervical exam, UA and BP check, Ultrasound for size and BPP and NST   35 weeks: MD visit with cervical exam, UA and BP check  36 weeks: MD visit with cervical exam, UA and BP check, NST   37 weeks: MD visit with cervical exam, UA and BP check  38 weeks: MD visit with cervical exam, UA and BP check, Ultrasound for size with BPP and NST  39 weeks: MD visit with cervical exam, UA and BP check, NST  40 weeks: MD visit with cervical exam, UA and BP check, Ultrasound for size, Twice weekly NST, Or Deliver!  41 weeks: Discuss induction at 41 weeks.     Subjective: Stef presents to the clinic today for a routine OB visit. She is feeling a lot of good baby movement. She does not want her baby to have a circumcision. Baby will be a patient at this clinic. She started to have acid reflux late last week and she has been taking Tums. The Tums has been working; the reflux hasn't been too bad. She is eating spicy food. 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 She doesn't need Zantac for now. She met with the diabetic educator. Her blood sugar readings have been higher than normal, so she stopped checking. She thinks her blood sugar is high because of her diet; in her culture, she eats a lot of rice. Her blood sugars need to be 140 or under when she meets with the educator next time, or she might need to start insulin. She had a follow up appointment, but she needed to cancel because she already took time off work for this appointment. At 1 pm, she had a blood sugar of 66, and the diabetic educator said that was very low; she thinks that was because she has an early breakfast around 6 am every day, and she didn't eat anything else before that appointment when her blood sugar was checked.  Later that day, her blood sugar was 155 after lunch and 178 after dinner. Checking her blood sugar 4 times per day stresses her out and thinking about meal planning is stressful; she does realize the importance of controlling diabetes in pregnancy. She tries to limit her rice intake, but she finds herself going back for more food because she is still hungry and she finds herself lacking self control. She eats sweets regularly, like cake, cookies, cupcakes, pies, ice cream, sometimes chocolate. She craves soda, and she has about 1 can per day, and sometimes about half a can per day. Her friends cook and invite her over as well. One of her struggles is that she has a hard time sleeping; she is up at night and she is eating during that time. She is also napping during the day because she is tired. She is up at night and she is bored, so she watches a movie and eats regular food. She usually goes to sleep around 2-3 am because her  found a new job and is working the second or third shift. She starts work around 8 am every day. She is very tired around that time. She finds herself tossing and turning at night and she needs to get up frequently to urinate. It takes a long time to fall asleep. She asked to go on bedrest because she was so tired, but she is feeling better this week. She is not interested in a sleep aid like melatonin at this time, but this week she might try to get back to a regular sleep cycle. She can try check her blood sugar regularly again and limit her rice. She can try to take time off work to be seen weekly in clinic. She is overweight with this pregnancy and she is not as active, so she has been very tired; she doesn't have the energy to walk around the mall and go shopping. She stopped taking her prenatal vitamin about 2 months ago because she finished the bottle and didn't  a refill. She would like to have a regular menstrual period after her baby is born. She is planning to breast  feed. She is planning to have more babies, but after this baby, her first priority will be to lose weight and get healthy. She is wondering what the Tdap is protecting her and baby against; she wants the shot, but she is too nervous today. She had an epidural with her first, and she had a bad experience. She had an IV with her two other boys. She had ultrasounds for low fluid around 37 or 38 weeks. She is not feeling sad or down. She has urinary frequency when baby moves and in general; she doesn't feel like she has a bladder infection. She is having middle and low back pain when she lays down; she feels stiff. She would be willing to try a lumbar brace. She denies any vaginal spotting or abnormal discharge.     Objective: See above.   MSK & OMT: L5-S1 rotated left side bent right. Anterior inferior innominate on the right.  T12 rotated left side bent left, tight hamstrings bilateral  Procedure osteopathic manual therapy performed to 4 body regions using combination of myofascial release, stills indirect technique and muscle energy.  Patient tolerated procedure well with improvement of somatic dysfunction    The visit lasted a total of 36 minutes face to face with the patient. 5 min of which spent performing osteopathic manual therapy . Over 50% of the time was spent counseling and educating the patient about routine prenatal care.    I, Kisha Forbes, am scribing for and in the presence of Dr. Wilkes.  I, Dr. Poonam Wilkes DO , personally performed the services described in this documentation as scribed by Kisha Forbes in my presence, and it is both accurate and complete.

## 2021-06-16 NOTE — PROGRESS NOTES
[facility] GDM Care Plan    Assessment:  Stef is here for gestational diabetes.  She is 31+ weeks gestation.  BG today was 66 mg/dl- it had been 7 hours since eating this morning.   She typically eats 3 meals and occ snacks each day, prefers typical [place] foods.      Plan:  To test FBS and one hour after each meal.   Reviewed nutrition guidelines with carb guidelines 3 meals/3 snacks.  Rec to check morning urine ketones.   Agreed to log food and BG and return in one week.       Provider: Dr. Wilkes  Provider's Diagnosis (per referral form): Gestational (648.83)    Weight: 173 lb 1.6 oz (78.5 kg)  OGTT:   Results for orders placed or performed in visit on 18   Glucose, Gestational Challenge 2 Hour   Result Value Ref Range    Glucose, 2 Hour 172 (H) 60-<155 mg/dL   Glucose, Gestational Challenge 1 Hour   Result Value Ref Range    Glucose, 1 Hour 157 60-<180 mg/dL   Glucose, Gestational Challenge Fasting   Result Value Ref Range    Glucose, Fasting 83 60-<95 mg/dL    Patient Fasting > 8hrs? Yes    Glucose, Gestational Challenge 3 Hour   Result Value Ref Range    Glucose, 3 hour 159 (H) 60-<140 mg/dL     EDC: Estimated Date of Delivery: 18   [facility]  Pregnancy #: 6   -  3 live births   (2misc., 1 abor)  Previous GDM: No  Medications:   Current Outpatient Prescriptions:      acetone, urine, test (ACETONE, URINE, TEST) Strp, Use 1 each As Directed daily before breakfast., Disp: 50 strip, Rfl: 3     blood glucose test (CONTOUR NEXT STRIPS) strips, Use 1 each As Directed 4 (four) times a day., Disp: 100 strip, Rfl: 3     generic lancets (FINGERSTIX LANCETS), 1 each by In Vitro route 4 (four) times a day., Disp: 100 each, Rfl: 3     prenat75-iron fum-folic ac-om3 (ONE A DAY WOMEN'S PRENATAL DHA) 28 mg iron- 800 mcg Cmpk, Take 1 tablet by mouth daily., Disp: 90 each, Rfl: 4    PNV: yes  Meter: Lacie  B mg/dl  BG monitoring goals: Fasting <95; 1 hour post start of meal <140. Test 4 x per day.  Check fasting a.m.  ketones: Yes  GDM meal pattern/carb counting taught per guidelines: Yes  Goals: Nutrition: GDM meal plan  Activity:  Walking after meals when able/staying active  Monitoring:  BG 4x/day as directed, ketones every morning    F/u in 1 week to assess BG and food log     DIABETES CARE PLAN AND EDUCATION RECORD    Gestational Diabetes Disease Process/Preconception Care/Management During Pregnancy/Postpartum:    Meter (per above goals): Discussed, Literature provided and Patient returned demonstration    Nutrition Management    Weight: Assessed, Discussed and Literature provided  Portions/Balance: Assessed, Discussed and Literature provided  Carb ID/Count: Assessed, Discussed and Literature provided  Label Reading: Assessed, Discussed and Literature provided  Menu Planning: Assessed, Discussed and Literature provided  Dining Out: Assessed, Discussed and Literature provided  Physical Activity: Discussed and Literature provided    Acute Complications: Prevent, Detect, Treat:    Goal Setting and Problem Solving: Assessed and Discussed  Barriers: Assessed and Discussed  Psychosocial Adjustments: Assessed and Discussed      Time: 60 minutes  Visit Type: GDM Individual  Visit #: 1

## 2021-06-16 NOTE — PROGRESS NOTES
Assessment & Plan:  6 para 3 pregnancy -patient here for routine OB visit which we got behind on.  She is doing her 1 hour glucose testing today which he unfortunately failed and will come back for 3 hour testing.  We discussed that if she  ends of being gestational diabetes then we will need to obtain ultrasounds in the third trimester anyway.  If not we can do a one-time ultrasound soon to ensure that fluid around baby is stable in the third trimester.  We discussed her hepatitis B immunity through previous infection.  She does not have active infection at this time.  I would like patient to go back in 4 weeks    Subjective: Stef presents to the clinic today for a routine OB visit. She has been feeling good baby movement. She will be having a baby boy. Her 's name is Ser, and he is the only one planning to be in the delivery room; he plans to cut the cord. She doesn't plan to keep the placenta. She isn't planning to have a postpartum tubal ligation. She is hoping to deliver at [facility] and she is hoping for IV pain medications. She had an epidural with her daughter which was her first pregnancy, age 17, and it slowed down her labor; her daughter had meconium, and she was a very colicky baby. The epidural was very painful but that labor wasn't very painful until that point. She didn't breast feed her other kids, but she wants to try with this one. She thinks her insurance only covers manual breast pumps; she is wondering how to get a breast pump. She doesn't have a birth plan. She is planning to have Dr. Wilkes see the baby after delivery. She had left sided abdominal cramping about two days ago; it wasn't bad, so she just laid down. She called [facility], but they didn't call her back until the next morning. She called the L&D nurse line a while after calling [facility], and she drank two water bottles, and then she felt better. She felt good baby movement the whole time. Her  second son came out very dry and her placenta was brown, and the midwife said her placenta had maybe stopped working for a week or two. She is wondering if she could get an ultrasound of her baby to check on the fluid around the baby. She doesn't drink much water at all; she doesn't like the taste unless it is from a bottle, but she will always choose juice or soda over water. She wasn't sick, but she had a heavy headed feeling the day she had abdominal cramping; she thought she would get the flu, but she didn't. She thinks she has overactive bladder; it has been present since she was a teenager. She is used to urinating all the time. She involuntarily leaks urine; she is wondering what her options are. She hates having to urinate all the time. She has urinary urgency often. She takes sips of soda during the day because she is thirsty, to amount to a coffee cup size per day. 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 When she urinates, she doesn't urinate a large amount. If she just sits on the toilet, she will urinate a small amount. If there is no restroom available, she will leak some urine. Her daughter was 7 pounds 3 ounces, and her two boys were 6 pounds each. She had urinary leakage before she gave birth to her children; she had to change her underwear multiple times per day. It is urine, not vaginal discharge. She tried doing Kegel exercises, but she isn't sure if she is doing it right. When she needs to urinate, she needs to urinate soon or it will be uncomfortable for her. She doesn't use any other medications, supplements, or substances. She does feel 10 baby movements within 2 hour time periods. She declines the flu shot. She would like to hold off on the Tdap until her next visit. Working from home is going okay. She is wondering if she is immune to hepatitis B; she had a previous hepatitis B infection, but now  she is immune. She was told she was a carrier. She doesn't remember if she saw a GI specialist during her second pregnancy; her lab values were not concerning. She thinks it was with [facility]. Her kids had labs when they were little and they were cleared. She can wait on pelvic floor therapy until after the baby is born. She didn't pass her 1 hour glucose challenge today. She denies any fever, diarrhea, constipation, vaginal bleeding or spotting, nausea, dysuria, contractions, itching, dizziness, or abdominal pain.     Objective: See above.    Recent Results (from the past 24 hour(s))   Urinalysis-UC if Indicated   Result Value Ref Range    Color, UA Yellow Colorless, Yellow, Straw, Light Yellow    Clarity, UA Clear Clear    Glucose, UA Negative Negative    Bilirubin, UA Negative Negative    Ketones, UA Negative Negative    Specific Gravity, UA 1.020 1.005 - 1.030    Blood, UA Trace (!) Negative    pH, UA 7.0 5.0 - 8.0    Protein, UA 30 mg/dL (!) Negative mg/dL    Urobilinogen, UA 1.0 E.U./dL 0.2 E.U./dL, 1.0 E.U./dL    Nitrite, UA Negative Negative    Leukocytes, UA Small (!) Negative   Glucose,Gestational Challenge (1 Hour)   Result Value Ref Range    Glucose, Gestational Challenge 1hr 165 (H) 70 - 139 mg/dL    Patient Fasting > 8hrs? No    Hemoglobin   Result Value Ref Range    Hemoglobin 11.9 (L) 12.0 - 16.0 g/dL     The visit lasted a total of 31 minutes face to face with the patient. Over 50% of the time was spent counseling and educating the patient about routine prenatal care.    I, Kisha Forbes, am scribing for and in the presence of Dr. Wilkes.  I, Dr. Poonam Wilkes DO , personally performed the services described in this documentation as scribed by Kisha Forbes in my presence, and it is both accurate and complete.

## 2021-06-16 NOTE — PROGRESS NOTES
I helped schedule all appts for patient, patient did not want to schedule past the last appt May 11th. FYI.

## 2021-06-17 NOTE — PROGRESS NOTES
[facility] GDM Care Plan    Assessment: pt seen today for f/u. She brings in BG meter.   FBG: all WNL  Breakfast - only 1 reading at 98 (2hr check). She is typically only eating 2 meals/day.   Lunch: 123, 139, 200 (noodles), 109, 78, 156  Dinner: 152, 139, 150, 150, 90, 116, 227, 112    She is trying to do better with diet and not drinking soda, but struggling with with sometimes. She feels most elevations are related to too much noodles, rice or soda.   Discussed with Elham Guthrie NP.   Will start on Novolog 2 units with lunch if she is having noodles, rice or soda. Will start 2 units with dinner daily.   Pt instructed to increase to 4 units prior to meals if within a couple days her pp readings are still >140 with 2 units.   Reviewed injection technique and pt was able to demo w/o difficulty. Reviewed signs and symptoms of hypoglycemia and treatment.       Visit Type: GDM Individual Follow-up  Time: 30   Provider: [facility]  Provider's Diagnosis (per referral form): Gestational (648.83)  OGTT:   Results for orders placed or performed in visit on 03/02/18   Glucose, Gestational Challenge 2 Hour   Result Value Ref Range    Glucose, 2 Hour 172 (H) 60-<155 mg/dL   Glucose, Gestational Challenge 1 Hour   Result Value Ref Range    Glucose, 1 Hour 157 60-<180 mg/dL   Glucose, Gestational Challenge Fasting   Result Value Ref Range    Glucose, Fasting 83 60-<95 mg/dL    Patient Fasting > 8hrs? Yes    Glucose, Gestational Challenge 3 Hour   Result Value Ref Range    Glucose, 3 hour 159 (H) 60-<140 mg/dL      Estimated Date of Delivery: 5/11/18 - pt notes she may induce around 38 weeks   Pregnancy #: 6, 3 living   Previous GDM: No   Medications:   Current Outpatient Prescriptions:      acetone, urine, test (ACETONE, URINE, TEST) Strp, Use 1 each As Directed daily before breakfast., Disp: 50 strip, Rfl: 3     blood glucose test (CONTOUR NEXT STRIPS) strips, Use 1 each As Directed 4 (four) times a day., Disp: 100 strip, Rfl: 3      "generic lancets (FINGERSTIX LANCETS), 1 each by In Vitro route 4 (four) times a day., Disp: 100 each, Rfl: 3     insulin aspart U-100 (NOVOLOG FLEXPEN U-100 INSULIN) 100 unit/mL injection pen, 2 units twice per day, before meals., Disp: 3 mL, Rfl: 0     pen needle, diabetic (BD ULTRA-FINE RUTH PEN NEEDLES) 32 gauge x 5/32\" Ndle, Use 1 each As Directed 2 (two) times a day., Disp: 100 each, Rfl: 0     prenat75-iron fum-folic ac-om3 (ONE A DAY WOMEN'S PRENATAL DHA) 28 mg iron- 800 mcg Cmpk, Take 1 tablet by mouth daily., Disp: 90 each, Rfl: 4    BG monitoring goals: Fasting <95; 1 hour post start of meal <140. Test 4 x per day.  Check fasting a.m. ketones: No  GDM meal pattern/carb counting taught per guidelines: Yes    Past Goals:  Nutrition: GDM meal plan SOMETIMES MET   Activity: Walking after meals when able/staying active SOMETIMES MET   Monitoring: BG 4x/day as directed, ketones every morning MOSTLY MET       New Goals:  Nutrition: GDM meal plan   Activity: Walking after meals when able/staying active   Monitoring: BG 4x/day as directed, ketones every morning    DIABETES CARE PLAN AND EDUCATION RECORD    Gestational Diabetes Disease Process/Preconception Care/Management During Pregnancy/Postpartum:Discussed    Meter (per above goals): Assessed and Discussed    Nutrition Management    Weight: Assessed and Discussed  Portions/Balance: Assessed and Discussed  Carb ID/Count: Assessed and Discussed  Label Reading: Assessed and Discussed  Menu Planning: Assessed and Discussed  Dining Out: Assessed and Discussed  Physical Activity: Assessed and Discussed    Acute Complications: Prevent, Detect, Treat:    Goal Setting and Problem Solving: Assessed and Discussed  Barriers: Assessed and Discussed  Psychosocial Adjustments: Assessed and Discussed    "

## 2021-06-17 NOTE — PROGRESS NOTES
Assessment & Plan: Stef was seen today for routine prenatal visit.    Diagnoses and all orders for this visit:    Pregnancy  -     Urinalysis Macroscopic    GDM (gestational diabetes mellitus)    Situational anxiety    Fall      -Patient is seen for follow-up of her high risk pregnancy secondary to gestational diabetes that is poorly controlled 's due to lack of motivation on the patient's part of checking blood sugars intentionally not to see elevated numbers.  She is starting to become a little more motivated within the last couple days and seeing the diabetic educator and is trying to cut back on soda intake and I encouraged her also to cut back on Gatorade as well.  Would prefer that she is increasing her water in the meantime and minimizing intake of fast food.  She has cut out maintain eating and I think that is helpful as well.  Her ultrasound biophysical yesterday was normal which was reassuring since you she recently had a fall onto her bottom and back.  There was no loss of fluid, bleeding or discharge.  She has been feeling good baby movement and there is been normal heart tones.  Encouraged her to come in for evaluation if ever that were to happen again.  Will hold off on another ultrasound until after her next visit next week.  NST at next visit as well as group B strep testing.  We discussed if she is not able to get her blood sugar is under control by next visit with the diabetic educator that she may need to go on insulin we also may need to induce her by 38 weeks.  She is having mild discomfort into her right upper arm from the fall but nothing that needs imaging at this time.  Amniotic fluid was normal on ultrasound    Subjective: She slipped and fell down the stairs a couple of days ago while walking at her home. Her  mentioned that the outside stairs were slippery but she was not being cautious while walking down them. She ended up falling on her right side and hit her arm and hip. She  "landed on her buttocks first and then hit her arm. She does not feel like her arm is broken and is still able to lift/bear weight with the arm. She did not feel any contractions or any other abdominal pain. She denies any leakage of fluid after the fall. She had an US completed yesterday (after the fall) and it showed a \"normal 8/8 biophysical profile\". She has been trying to increase her water intake and has also been trying to drink a lot of Gatorade. She does not have any special requests for birth at this time. Her back started hurting again after she fell but notes that it was doing better prior. She has a wart on her right lateral wrist that has been present for 3-4 years. At some point she would like this to be frozen off, if able.     Anxiety: Her anxiety is okay this week with not having to go to work. She has been cleaning and trying to prepare for the baby coming. Paperwork for her FMLA is still needed and she confirms that it will be completed and sent by tomorrow. She denies any outright [place] Lanza contractions but she does have some tightening in her abdomen on occasion. She denies any swelling in her legs. Her headaches have not been noticeable this week.     Gestational Diabetes: She met with a diabetic educator yesterday. She only checked her blood sugars twice this last week despite being told to check more often. She notes that there has been a lot of stuff going on socially including Easter celebrations and a surprise baby shower, and that is a reason why she was not checking as regularly. She used to like to wake in the middle of the night and eat, however, she has stopped this habit. As of Tuesday, her blood sugars have not been above 90 when checked. Before that, her blood sugars tended to be in the 140-160 range when checked after eating a meal. She checked this morning and her blood sugar was around 70. Additionally, she has switched to drinking diet soda. When she drinks fluids, she " prefers drinking something flavored, this is why she goes for pop. She wonders if Crystal Lite packets are okay to add to water. A UA was checked today and it showed trace ketones and protein present. She has continued to eat her typical foods and has not necessarily increased her fruit/vegetable intake despite being encouraged to do so after her potassium level was found to be 3.1 as of 3/30/2018.   The following high BMI interventions were performed this visit: encouragement to exercise, dietary management education, guidance, and counseling and lifestyle education regarding diet      Objective: See above.   Musculoskeletal: Mild pain to palpation along humerus.       The visit lasted a total of 22 minutes face to face with the patient. Over 50% of the time was spent counseling and educating the patient about gestational diabetes and routine prenatal care.    I, Mary Beth Rivera, am scribing for and in the presence of, Dr. Poonam Wilkes.  I, Dr. Poonam Wilkes DO , personally performed the services described in this documentation, as scribed by Mary Beth Rivera in my presence, and it is both accurate and complete.
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
## 2021-06-17 NOTE — PROGRESS NOTES
ASSESSMENT & PLAN:  Finch was seen today for hospital visit follow up.    Diagnoses and all orders for this visit:    Hospital discharge follow-up    Abdominal pain    Cough    Hemorrhagic cyst of left ovary    Gestational diabetes mellitus (GDM) in third trimester controlled on oral hypoglycemic drug    Heart murmur  -     Echo Complete; Future    Other orders  -     fluticasone (FLONASE) 50 mcg/actuation nasal spray; Instill 1 spray in each nostril bid    -Patient was seen today for hospital discharge follow-up for evaluation of postpartum abdominal pain that they thought was potentially appendicitis.. Symptoms have not progressed and imaging revealed no concerning findings other than a left hemorrhagic cyst.  Patient is not yet ready for any type of birth control and we discussed protection if she wants to space her children out.  She is no longer having abdominal pain.  She thinks it was more muscle strain from coughing.  She continues to cough but it is mild.  I think it could be a component of allergy or postnasal drip so fluticasone nasal spray was prescribed.  Today she also has a very prominent cardiac murmur on examination that I have never heard before.  She has no other symptoms of heart failure but given that sometimes one can develop a postpartum cardiomyopathy would like to get an echocardiogram on her to evaluate further.  She does not have any shortness of breath.  She also is significantly anemic.  I recommended starting an iron supplementation or back to prenatal vitamin and reevaluate her iron levels at her postpartum follow-up at 8 weeks     Patient Instructions   Consider taking prenatal vitamins again, OR start taking ferrous sulfate 325 mg 1-2 times per day until we recheck your hemoglobin and make sure it has increased.     Consider using Flonase/fluticasone 1 spray in each nostril twice daily for your cough, runny nose, and postnasal drip. It is over the counter as well as prescription.         Orders Placed This Encounter   Procedures     Echo Complete     Standing Status:   Future     Standing Expiration Date:   5/10/2019     Scheduling Instructions:      Post partum heart murmur LSB  s     Order Specific Question:   Reason for exam?     Answer:   Heart murmur     Medications Discontinued During This Encounter   Medication Reason     prenat75-iron fum-folic ac-om3 (ONE A DAY WOMEN'S PRENATAL DHA) 28 mg iron- 800 mcg Cmpk Therapy completed     insulin aspart U-100 (NOVOLOG FLEXPEN U-100 INSULIN) 100 unit/mL injection pen Therapy completed     glyBURIDE (DIABETA) 2.5 MG tablet Therapy completed       No Follow-up on file.     CHIEF COMPLAINT:  Chief Complaint   Patient presents with     Hospital Visit Follow Up     [facility] 18 Abdominal Pain-pt states abdominal pain went away        HISTORY OF PRESENT ILLNESS:  Stef is a 32 y.o. female presenting to the clinic today with her  and , Anibal, for an inpatient follow up. She was admitted to [facility] on May 3, 2018 for abdominal pain and she was discharged on May 4, 2018. She notes she went into the ED early in the morning on May 3rd, and she stayed overnight and into the next day for observation. They thought she might be having appendicitis. Her abdominal pain was in the middle of her lower abdomen, and she was concerned something was wrong with her uterus. The pain radiated to both sides. She was coughing very hard for a few days prior to her ED visit, and it was causing pain in her whole abdomen. She thinks she may have strained a muscle. She feels better now; she doesn't have any more abdominal pain. The general surgeon didn't think her appendix needed to be removed. She also had a hemorrhagic cyst on the left side.     Postnasal Drip/Cough: She was told to get Mucinex over the counter for her cough. She has a lot of phlegm in her throat. Her cough keeps her up at night, and she coughs throughout the day. She feels like  the Mucinex is not helpful. No one has been sick around her, but her daughter had a runny nose one day. Her only symptoms are a cough and a runny nose, but the runny nose doesn't last all day. Her daughter has seasonal allergies, but she isn't sure if she does herself. She develops allergic hives occasionally. She doesn't feel like the phlegm is in her lungs, but it is in her throat.     Irregular Menses: They are not using birth control right now; she had problems regulating her menstrual periods in the past, but she doesn't know if her period will be regular now that she has had her baby, Anibal. She is hoping to have more children in the future.     Gestational Diabetes: She hasn't been checking her blood sugars at all. They did not check her blood sugar in the hospital at [facility]. She is not taking glyburide.     Anemia: She has postpartum bleeding still, but it is very light to almost nothing. Her hemoglobin was 8.9 on 5/4/2018 at [facility]. She is not taking her prenatals anymore. Her insurance doesn't cover prenatals. She is tired because the baby keeps her awake at night.     Heart Murmur: She feels like the swelling in her legs now is better than last week. She has never been told she a heart murmur before. She is willing to have an echocardiogram.     REVIEW OF SYSTEMS:   She hasn't really noticed her overactive bladder, anxiety, or back pain since she gave birth. She denies any fever, nausea, emesis, dizziness, shortness of breath, or orthopnea. All other systems are negative.     PFSH:    History   Smoking Status     Never Smoker   Smokeless Tobacco     Never Used     Family History   Problem Relation Age of Onset     Throat cancer Mother      Nasopharyngeal cancer     Thyroid disease Mother      due to radiation and chemotherapy for nasopharyngeal cancer     Asthma Brother      Hepatitis Brother      Hepatitis B     Social History     Social History     Marital status: Single     Spouse name:  N/A     Number of children: N/A     Years of education: N/A     Occupational History     Not on file.     Social History Main Topics     Smoking status: Never Smoker     Smokeless tobacco: Never Used     Alcohol use No     Drug use: No     Sexual activity: Yes     Partners: Male     Birth control/ protection: None     Other Topics Concern     Not on file     Social History Narrative     Past Surgical History:   Procedure Laterality Date     WISDOM TOOTH EXTRACTION       No Known Allergies  Active Ambulatory Problems     Diagnosis Date Noted     Overactive bladder 2018     GDM (gestational diabetes mellitus) 2018     Situational anxiety 2018      (normal spontaneous vaginal delivery)      Resolved Ambulatory Problems     Diagnosis Date Noted     Abnormal weight gain      Low back pain during pregnancy 2018     Pregnancy 2018     Pregnant 2018     Past Medical History:   Diagnosis Date     Abnormal glucose tolerance test (GTT) 2018     Hepatitis B carrier 2006     Hepatitis B infection 2014        VITALS:  Vitals:    05/10/18 1143   BP: 122/70   Patient Site: Right Arm   Patient Position: Sitting   Cuff Size: Adult Large   Pulse: 64   Resp: 16   Temp: 98  F (36.7  C)   TempSrc: Oral   Weight: 161 lb 1.6 oz (73.1 kg)     Wt Readings from Last 3 Encounters:   05/10/18 161 lb 1.6 oz (73.1 kg)   18 176 lb (79.8 kg)   18 176 lb 6.4 oz (80 kg)     Body mass index is 32.54 kg/(m^2).  Patient's last menstrual period was 2017 (exact date).      PHYSICAL EXAM:  GENERAL:  Reveals an alert 32 y.o. female in NAD.  Vitals:  Per nursing notes.  NECK:  Supple, thyroid not palpable.  EYES: PERRLA. Extraocular movements intact. Normal conjunctiva and lids.   ENT:  Hearing grossly normal.  Normal appearance to ears and nose. Bilateral TM s, external canals, oropharynx normal. Normal lips, gums and teeth. Normal nasal mucosa, septum and turbinates.   CARDIAC:   Regular rate and rhythm with III/VI systolic crescendo heart murmur over the left sternal border. Trace edema of the lower legs bilateral. Carotids without bruits.   LUNGS: Clear.  Respiratory effort normal.  Abdomen: Soft nondistended nontender to palpation  SKIN:   Without rash, bruise, or palpable lesions.  NEURO:  Cranial nerves II-XII intact.     PSYCH:  Mood appropriate, memory intact.        DATA REVIEWED:  ADDITIONAL HISTORY SUMMARIZED (2): Reviewed [facility] notes 5/3-5/4/2018.    DECISION TO OBTAIN EXTRA INFORMATION (1): Accessed Care Everywhere.   RADIOLOGY TESTS (1): Reviewed [place] pelvic US and abdominal CT reports 5/3/2018.  LABS (1): Reviewed and ordered labs.   MEDICINE TESTS (1): Ordered echocardiogram.  INDEPENDENT REVIEW (2 each): None.      The visit lasted a total of 17 minutes face to face with the patient. Over 50% of the time was spent counseling and educating the patient about her recent hospitalization, chronic health conditions, cough, medications, and follow up.     I, Kisha Forbes, am scribing for and in the presence of Dr. Wilkes.  IPoonam DO , personally performed the services described in this documentation, as scribed by Kisha Forbes in my presence, and it is both accurate and complete.    This note has been dictated using voice recognition software. Any grammatical or context distortions are unintentional and inherent to the software.     MEDICATIONS:  Current Outpatient Prescriptions   Medication Sig Dispense Refill     acetone, urine, test (ACETONE, URINE, TEST) Strp Use 1 each As Directed daily before breakfast. 50 strip 3     blood glucose test (CONTOUR NEXT STRIPS) strips Use 1 each As Directed 4 (four) times a day. 100 strip 3     docusate sodium (COLACE) 100 MG capsule Take 1 capsule (100 mg total) by mouth daily.  0     generic lancets (FINGERSTIX LANCETS) 1 each by In Vitro route 4 (four) times a day. 100 each 3     ibuprofen (ADVIL,MOTRIN) 800 MG tablet  "Take 1 tablet (800 mg total) by mouth every 8 (eight) hours. 30 tablet 0     pen needle, diabetic (BD ULTRA-FINE RUTH PEN NEEDLES) 32 gauge x 5/32\" Ndle Use 1 each As Directed 2 (two) times a day. 100 each 0     fluticasone (FLONASE) 50 mcg/actuation nasal spray Instill 1 spray in each nostril bid 17 g 0     No current facility-administered medications for this visit.         Total data points: 6    "

## 2021-06-17 NOTE — PROGRESS NOTES
Assessment & Plan:  6 para 3 pregnancy   Finch was seen today for routine prenatal visit.    Diagnoses and all orders for this visit:    Pregnancy  -     Urinalysis Macroscopic; Standing  -     Urinalysis Macroscopic  -     US Biophysical Profile W Umbilical Arterial Doppler; Future  -     US Biophysical Profile W Umbilical Arterial Doppler  -     Comprehensive Metabolic Panel  -     HM2(CBC w/o Differential)    GDM (gestational diabetes mellitus)  -     US Biophysical Profile W Umbilical Arterial Doppler; Future  -     US Biophysical Profile W Umbilical Arterial Doppler  -     Comprehensive Metabolic Panel  -     HM2(CBC w/o Differential)    Anxiousness    Other orders  -     Tdap vaccine,  6yo or older,  IM      -Patient is here today for return OB visits and she has gestational diabetes that she is not monitoring as she should be.  She is feeling overwhelmed by the stress of work and therefore is not taking very good care of herself.  She is not checking blood sugars and stop doing so last week when I spoke with her because did not like seeing how high the numbers were.  She also has not scheduled any diabetic follow-up visits.  She presented today with a [place] size Coca-Cola.  I reminded her again that she needs to refrain from doing any sugary drinks at this time and modify her diet so that we hopefully do not need to put her on insulin.  I wanted to get labs on her today since I do not have any idea where she is at in terms of her diabetes.  Urinalysis did not show any ketones or glucose.  Waiting for CMP and CBC.  Discussed taking her out of work which I have written her a letter to do since I think that the stress of work is not benefiting her health at this time and she needs to focus on her health and taking care of her growing fetus.  I recommended repeating a biophysical within another week as her SATNAM was borderline and she needs to increase her fluid intake as she has had a history of low SATNAM in  the past.  Growth was normal so we are not obtaining growth.  Next office visit we will also do NST.  We have made her a diabetic follow-up visit by Wednesday and explained the importance of her having good diabetic control and she is aware of all the potential risks including  labor.  Hypoglycemia in the , large fetus and risk for dystocia.  Potential for placental insufficiency.  Reminded her she needs to do her regular kidck counts  and also gave her a form to fill out the registration for the hospital.  tdap given     Subjective: Stef presents to the clinic today for a routine OB visit. She is feeling good baby movement. Her ultrasound from 3/27/2018 revealed low normal SATNAM. She has been so stressed that she hasn't been checking her blood sugars. She didn't bring her glucometer today. She would like to have a Tdap today. She is very stressed; work is putting a lot pressure on her. They are giving her warnings and performance evaluations. 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 She doesn't think she is falling behind in anything; they get their work checked by somebody else, and so somebody else thinks she isn't coding correctly. She is trying to improve, and yesterday she had a one on one with her boss and she is supposed to improve in the next three weeks, and that time frame is not realistic, and on top of that, she is pregnant; she can't focus on her pregnancy. Her accuracy was an issue, but now she has a final warning, and her plan for the next three weeks is not realistic. She isn't sure if they are trying to purposely get rid of her before she goes on maternity leave. They allow 6 weeks for maternity leave and she can also use PTO for additional time. She can't use PTO right now because of her final warning; she would need a doctor's note and a medical reason. She gets a headache when she thinks about her situation. She doesn't know if she is going to lose her job in three weeks and lose  her maternity leave. [employer] is a good organization, and she has worked for some good organizations in the past, but she heard things like if they are going to let people go, they just let people go; she didn't expect they might try to do that to her right before her maternity leave. She doesn't think having a medical reason for leaving work on disability early would prompt them to try to fire her. This week has been a crazy week for her, so she hasn't made any diabetic education visits; she wanted to be seen today and she wants to get back on track. She called into work today because it was too stressful. She would be okay with a couple weeks without pay. She woke up with a pretty bad headache this morning. Her back felt better after an OMT adjustment last time; she doesn't really wear the back brace, but it is alright. The brace is kind of tight. She is still up at night, and she thinks it is because she is losing sleep thinking about things going on in her life. She denies any bladder symptoms, abdominal pain, or swelling.     Objective: See above.     The visit lasted a total of 20 minutes face to face with the patient. Over 50% of the time was spent counseling and educating the patient about routine prenatal care.    I, Kisha Forbes, am scribing for and in the presence of Dr. Wilkes.  I, Dr. Poonam Wilkes DO , personally performed the services described in this documentation as scribed by Kisha Forbes in my presence, and it is both accurate and complete.

## 2021-06-17 NOTE — PROGRESS NOTES
Assessment & Plan:  6 para 3 pregnancy -discussed with patient that we received a call on her biophysical being 4 out of 8.  She did have normal NST here in clinic today.  Discussed case with in-house OB/GYN Dr. Coombs who agreed with plan to bring her in for induction.  Discussed with patient before leaving that we would bring her in for oral Cytotec for cervical ripening overnight.  Her Kuo score is 5 at this time.  She is hoping for an epidural and has had quick transition of labor in the past.  Discussed length of stay in the hospital as well as the potential for any intolerance of labor, , NICU stay given baby is gestational age if necessary.  I was able to coordinate patient's admission for tonight and will start Pitocin once cervix is favorable.  Will do Accu-Cheks prior to meals and sliding scale insulin as needed.  Anticipate     Subjective: Stef presents to the clinic today for a routine OB visit. She is GBS positive. Yesterday and today, she hasn't felt much baby movement, but she has felt 10 movements throughout the day. She has felt baby move here and there since she has been on NST. She last ate about 3.5-4 hours ago. She typically feels more baby movement between 9 pm - 12 am. She didn't really increase her fluid intake much after she was told about her low fluid around the baby, but then that fluid level went up. Today, her blood sugar was high after lunch at 158. She started taking glyburide on 2018 in the evenings and she hasn't missed any doses. She has been having a lot of very sticky saliva, and she has been spitting all the time. This morning, she felt like she was going to get the chills, but she felt better after she got up and got ready. She hasn't had much appetite, so she hasn't been eating at much. She eats fruit throughout the day. She sleeps a lot throughout the day as well because she feels very tired; even while she is laying here, she feels like she could  close her eyes and fall asleep. This has been ongoing for a while, not new since she was last seen in clinic. She forgot to check her blood sugar on April 21, 2018 when she started glyburide; she did eat that day. Her blood sugar was 75 fasting on 4/22, then 125 around 4 pm. On 4/22, her blood sugar was 76 fasting and it was 158 before coming to clinic today. She requested an epidural for her last delivery, but she was induced and by the time her labor kicked in, it was too late. Sometimes she has a bit of lower pelvic pain. She denies any contractions, change in vaginal discharge, or vaginal spotting.     Objective: See above.   NST Indication: abnormal biophysical 4/8. GDM  Baseline FHT: 150's  Reactive with moderate variability and good acceleration.  Category 1 Strip     The visit lasted a total of 27 minutes face to face with the patient and an additional 31 min spent coordinating care for induction . Over 50% of the time was spent counseling and educating the patient about routine prenatal care, gestational diabetes, and possible induction.    I, Kisha Forbes, am scribing for and in the presence of Dr. Wilkes.  I, Dr. Poonam Wilkes DO , personally performed the services described in this documentation as scribed by Kisha Forbes in my presence, and it is both accurate and complete.

## 2021-06-19 NOTE — LETTER
Letter by Leisa Aquino FNP at      Author: Leisa Aquino FNP Service: -- Author Type: --    Filed:  Encounter Date: 2019 Status: (Other)         Stef Hathaway  [address] [place] [address]      2019      Dear Stef Hathaway,   : 1985      This letter is in regards to the appointment that you had scheduled on 19 at the [facility] with Leisa Aquino.     The [facility] strives to see all patients in a timely manner and we need your help to achieve this.  The above-mentioned appointment was missed and we do not have record of a cancellation by you.  Whenever possible, we request appointment cancellations at least 72 hours in advance.  This time allows us to offer the appointment to another patient in need.      If you feel you have received this letter in error, or if you need to reschedule this appointment, please call our office so that we may update our records.      Sincerely,    [facility]

## 2021-06-19 NOTE — PROGRESS NOTES
Assessment:        Stef Hathaway is a 32 y.o. female here for postpartum exam at  9w weeks postpartum. Pap smear not due at today's visit.   1. Routine postpartum follow-up  Glycosylated Hemoglobin A1c    Hemoglobin    Pregnancy (Beta-hCG, Qual), Urine   2. Gestational diabetes mellitus (GDM) in third trimester controlled on oral hypoglycemic drug  Glycosylated Hemoglobin A1c   3. Amenorrhea     . .    Plan:         -We will check pregnancy test today to ensure okay to start the NuvaRing.  Patient likely has PCO S which we have discussed in the past and we also spent time discussing how this as well as the history of gestational diabetes can be a risk factor for developing diabetes.  Encouraged healthy exercise and eating.  Patient is working on weight loss.  She is unsure if they would like to have more kids yet in the future.  She would like to regulate her hormones would like to go back on the NuvaRing.  If she does not get a menstrual cycle on the NuvaRing could consider addition of metformin or change to oral contraceptive option.  Labs assessed today including A1c.    Subjective:       Stef Hathaway is a 32 y.o. female who presents for a postpartum visit with her  and son, Anibal. She is 9 weeks 3 days postpartum following a spontaneous vaginal delivery which was precipitous and no provider was present for delivery.  Delivery was uncomplicated.  No perineal tear. pregnancy was only complicated by gestational diabetes as well as abnormal biophysical at 37 weeks for which we induced the patient. . I have fully reviewed the prenatal and intrapartum course. The delivery was at 37 weeks 4 days gestation.   Postpartum course has been uncomplicated. Her stress and going back to work have been okay. She was tired during the pregnancy. Baby's course has been uncomplicated. Baby is feeding by bottle similac . Bled for 2-3 weeks postpartum.  Currently no bleeding. She hasn't gotten a menstrual period yet; she has  "always had an irregular menstrual period. Bowel function is normal. Bladder function is normal. Patient has resumed intercourse without any issues or pain. Contraception method is NuvaRing vaginal inserts starting today. Postpartum depression screening score: 0.  Patient has resumed intercourse without any issues.  She has not been checking blood sugars since delivery.    Gestational Diabetes: She hasn't been checking any blood sugars since delivery.     PCOS/Contraception: She does want to regulate her period because she feels unhealthy and like her hormones are off if she doesn't have a period every month. She has used NuvaRing and oral contraceptive pills in the past; she likes the NuvaRing. She is wondering if she should wait until she has a period before inserting it.    Overweight: She is trying to lose weight. Her  watches baby when she works, then he is exhausted when she is done. She wasn't sure when she could start going on walks with baby since it has been so hot out.    The following portions of the patient's history were reviewed and updated as appropriate: allergies, current medications and problem list    Review of Systems  She denies any shortness of breath. All other systems are negative.       Objective:      Vitals:    06/29/18 1134   BP: 100/70   Pulse: 92   Resp: 16   Weight: 158 lb 1.6 oz (71.7 kg)   Height: 4' 11\" (1.499 m)       Physical Exam:  GENERAL:  Reveals an alert female in NAD.  Vitals:  Per nursing notes.  EYES: PERRLA. Extraocular movements intact. Normal conjunctiva and lids.   ENT:  Hearing grossly normal.  Normal appearance to ears and nose.  Bilateral TM s, external canals, oropharynx normal. Normal lips, gums and teeth.  Normal nasal mucosa, septum and turbinates.  NECK:  Supple, without thyromegaly or mass.  LUNGS:  Clear to auscultation without crackles, wheezes or distress.  Normal respiratory effort.   CV:  Regular rate and rhythm without murmurs, rubs or gallops. No " varicosities or edema. Carotids without bruits.   ABDOMEN:  Soft, non-tender, without organomegaly, masses, or hernias.   BREASTS:  Non-tender, without masses, nipple discharge, erythema, or axillary adenopathy.    :  Normal pelvic exam, including external genitalia with normal appearance and no lesions. Normal vaginal exam, including no discharge and normal pelvic support, and no lesions. Cervix well visualized, with normal appearance and no lesions.  Slight yellowish green discharge cervical loss but patient does not concern for any STDs.  Normal uterus, including normal size, shape, mobility with no tenderness. Normal adnexa, including no nodules, masses or tenderness.  LYMPH: Normal palpation of neck, groin and axilla. No lymphadenopathy. No bruising.  NEURO:  CN II-XII intact.   PSYCH:  Alert & oriented with normal mood and affect.   SKIN:  Normal inspection and palpation.  MSK: Normal gait and station.        The visit lasted a total of 10 minutes face to face with the patient. Over 50% of the time was spent counseling and educating the patient about postpartum care, gestational diabetes, and oral contraception.    I, Kisha Forbes, am scribing for and in the presence of Dr. Wilkes.  I, Dr. Poonam Wilkes DO , personally performed the services described in this documentation as scribed by Kisha Forbes in my presence, and it is both accurate and complete.

## 2021-06-20 NOTE — PROGRESS NOTES
Stef was seen today for fertility counseling and warts.    Diagnoses and all orders for this visit:    Infertility counseling  -     Comprehensive Metabolic Panel  -     Glycosylated Hemoglobin A1c    History of gestational diabetes  -     Testosterone, Free and Total    Common wart    Infertility, anovulation       Other orders  -     prenat75-iron fum-folic ac-om3 (ONE A DAY WOMEN'S PRENATAL DHA) 28 mg iron- 800 mcg Cmpk; Take 1 capsule by mouth daily.      -We discussed patient's infertility that I think is more secondary to PCO S given her gestational diabetes that she has had and obesity.  Since she has gained a lot of weight she has not had normal menstrual cycles.  I spent a fair amount time counseling on appropriate nutrition and even offered referrals for weight loss if she needs more assistance.  She notes that she knows what she needs to do and I will give her 2 or 3 months to try to get her weight down to see if her cycles come back as well as with the metformin extended release 500 mg.  I told her that since her baby is only 5 months old that even having babies back to back would place her at higher risk and she is also at high risk because of her history of gestational diabetes, complicated pregnancy with abnormal biophysical and poor follow-up for her gestational diabetes.  I told her that I would prefer that we not do any induction of infertility yet until she has been working in achieving weight loss goals and as we are getting closer to a year from her last delivery can consider trying to help with inducing a menstrual cycle again.  She does still have time to conceive at least twice more before she turns 37.  Patient was understanding of the risks that I counseled her about and also the risk of Clomid induction and why I would not want to perform this unless she was in her healthiest state of being.  She is aware that I will be out on leave sometime in January and so she will try to see me by mid  to late December for follow-up on her weight and how she is doing with the metformin.  Labs obtained today.  -Cryotherapy performed to wart.  May present back within 2-4 weeks for repeat treatment    Patient Instructions   - restart the metformin xr 500mg once daily (we could potentially go up on dose). Let me know if any side effects  -lets use the next 2-3 months to focus on healthy eating -decrease unnecessary calories from drinks and carbs and watch portions  -try to get exercise at least 3 days per week   - follow up on weight and fertility by mid to late December          Orders Placed This Encounter   Procedures     Comprehensive Metabolic Panel     Glycosylated Hemoglobin A1c     Testosterone, Free and Total     Medications Discontinued During This Encounter   Medication Reason     ibuprofen (ADVIL,MOTRIN) 800 MG tablet Therapy completed     fluticasone (FLONASE) 50 mcg/actuation nasal spray Therapy completed     etonogestrel-ethinyl estradiol (NUVARING) 0.12-0.015 mg/24 hr vaginal ring Therapy completed       Return in about 3 months (around 12/17/2018).        The visit lasted a total of 27 minutes face to face with the patient with an additional 2 minutes spent performing cryotherapy. Over 50% of the time was spent counseling and educating the patient about above issues    CHIEF COMPLAINT:  Chief Complaint   Patient presents with     Fertility Counseling     Warts     Would like wart on wrist treated.        HISTORY OF PRESENT ILLNESS:  Stef Hathaway is a 32 y.o. female presents today to discuss infertility and anovulation.  She had contacted me via my chart wanting to have the same regimen prescribed for metformin, Clomid and progesterone and I suggested that she come in for consultation.  She has a 5-month-old at home and is ready to start trying to conceive again.  She has not yet gotten her menstrual cycle back.  She notes that she wants at least 2 more kids- daughter wants sister. Baby 5 months- oldest  14 . 1 daughter and 3 boys. Wants all kids before 35. Not had a period since baby born. Never used nuvaring that was prescribed at her postpartum visit- thought about birth control to regulate period but never got.  3 years ago periods began becoming irregular irregular. Bought diet pills over the counter- after taking had spotting. After that menses got wacky.   -With the last pregnancy we did do a round of Provera, Clomid and ovulation test strips because she had been trying for nearly a year to conceive or more and we had done labs that were fairly unremarkable.  - weight up 10lb since post partum visit.   -doing better financially and not as stressed as she was at the end of her visit..   -feels know what needs to eat and comfortable with what needs to do. Not exercising as much because friends like to cook and invite all the time.   -tolerated XR metformin which was started prior to conceiving with her last pregnancy ( got sick with short acting- makes feel nasty)   -She did have gestational diabetes with her last pregnancy that was poorly controlled.  Because of her stress levels she did not manage her diabetes while and I addressed this with the patient that I was concerned about this.  As result she had abnormal biophysical that led to induction around 38 weeks gestation.  Delivery ended up being precipitous during the induction  -also has a wart on R wrist that has been there for > year that she would like off  REVIEW OF SYSTEMS:    Comprehensive review of systems is negative except as noted in the HPI.     PFSH:  Tobacco use and medications reviewed below.     TOBACCO USE:  History   Smoking Status     Never Smoker   Smokeless Tobacco     Never Used       VITALS:  Vitals:    10/02/18 1339   BP: 111/74   Pulse: 92   Resp: 14   Temp: 97.8  F (36.6  C)   TempSrc: Oral   SpO2: 99%   Weight: 168 lb 4.8 oz (76.3 kg)     Wt Readings from Last 3 Encounters:   10/02/18 168 lb 4.8 oz (76.3 kg)   06/29/18 158 lb 1.6  oz (71.7 kg)   05/10/18 161 lb 1.6 oz (73.1 kg)       PHYSICAL EXAM:  Constitutional:  Reveals a 32 y.o. female in NAD.  Vitals:  Per nursing notes.  Neck:  Supple, thyroid not palpable.  Cardiac:  Regular rate and rhythm without murmurs, rubs, or gallops. Carotids without bruits. Legs without edema. Palpation of the radial pulse regular.  Lungs: Clear.  Respiratory effort normal.  Skin:   Without rash, bruise, or palpable lesions. Large 6mm wart volar aspect of R wrist  Rheumatologic: Normal joints and nails of the hands.  Neurologic:  Cranial nerves II-XII intact.     Psychiatric:  Mood appropriate, memory intact.        Procedure-verbal consent obtained for cryotherapy.  Discussed risks and benefits with patient.  Using liquid nitrogen the lesion that was approximately 6 mm was frozen for a total of 3 cycles creating a ball that extended 1 mm beyond the diameter of the lesion.  Gave aftercare instructions and also discussed that she may need repeat treatment.  We discussed soaking and filing down with an emery board.  Can follow-up within 2-4 weeks      MEDICATIONS:  Current Outpatient Prescriptions   Medication Sig Dispense Refill     metFORMIN (GLUCOPHAGE XR) 500 MG 24 hr tablet Take 1 tablet (500 mg total) by mouth daily with breakfast. 30 tablet 11     prenat75-iron fum-folic ac-om3 (ONE A DAY WOMEN'S PRENATAL DHA) 28 mg iron- 800 mcg Cmpk Take 1 capsule by mouth daily. 60 each 11     No current facility-administered medications for this visit.

## 2021-06-21 NOTE — PROGRESS NOTES
Procedure-verbal consent obtained for cryotherapy. Pt had presented for child's well child visit and asked if we could add on her appointment.   Discussed risks and benefits with patient.  Using liquid nitrogen the lesion  On the volar aspect of R wrist  That was approximately 6 mm was frozen for a total of 3 cycles creating a ball that extended 1 mm beyond the diameter of the lesion.  Gave aftercare instructions and also discussed that she may need repeat treatment.  We discussed soaking and filing down with an emery board.  Can follow-up within 2-4 weeks

## 2021-06-26 NOTE — PROGRESS NOTES
Progress Notes by Oriana Lorenz RN at 4/4/2018  4:00 PM     Author: Oriana Lorenz RN Service: -- Author Type: Registered Nurse    Filed: 4/9/2018  7:48 AM Encounter Date: 4/4/2018 Status: Attested    : Oriana Lorenz RN (Registered Nurse) Cosigner: Poonam Wilkes DO at 4/9/2018  9:17 AM    Attestation signed by Poonam Wilkes DO at 4/9/2018  9:17 AM    Agree with plan                 ROSE[facility] GDM Care Plan    Assessment: pt seen today for f/u. She was initally seen on 3/13 and has not been seen since by CDE.   She brings in BG meter. She stopped checking BG and just started again yesterday. Readings per meter:   4/3/18: FBG 77, after lunch 140, after dinner 146 (1/2 glass of regular coke).   4/4/18: FBG 79, after lunch 162 (burger myra whopper with medium sized regular sprite)    These are the only readings we have since 3/14/18. It is reassuring her FBG are WNL. Elevations are clearly related to soda. We discussed this at length. Pt agrees to stop soda and try diet Dr. Pepper or Coke Zero. Will give it 1 more week. If she cannot stop drinking soda will have to start insulin with meals. If she does stop drinking soda BG likely will not be elevated anymore.      Scheduled to f/u next Wednesday.       Visit Type: GDM Individual Follow-up  Time: 3-  Provider: Dr. Wilkes   Provider's Diagnosis (per referral form): Gestational (648.83)  Weight: 174 lb 14.4 oz (79.3 kg)  OGTT:   Results for orders placed or performed in visit on 03/02/18   Glucose, Gestational Challenge 2 Hour   Result Value Ref Range    Glucose, 2 Hour 172 (H) 60-<155 mg/dL   Glucose, Gestational Challenge 1 Hour   Result Value Ref Range    Glucose, 1 Hour 157 60-<180 mg/dL   Glucose, Gestational Challenge Fasting   Result Value Ref Range    Glucose, Fasting 83 60-<95 mg/dL    Patient Fasting > 8hrs? Yes    Glucose, Gestational Challenge 3 Hour   Result Value Ref Range    Glucose, 3 hour 159 (H) 60-<140 mg/dL      Estimated Date of  Delivery: 5/11/18   Pregnancy #: 6, 3 living   Previous GDM: No   Medications:   Current Outpatient Prescriptions:   ?  acetone, urine, test (ACETONE, URINE, TEST) Strp, Use 1 each As Directed daily before breakfast., Disp: 50 strip, Rfl: 3  ?  blood glucose test (CONTOUR NEXT STRIPS) strips, Use 1 each As Directed 4 (four) times a day., Disp: 100 strip, Rfl: 3  ?  generic lancets (FINGERSTIX LANCETS), 1 each by In Vitro route 4 (four) times a day., Disp: 100 each, Rfl: 3  ?  prenat75-iron fum-folic ac-om3 (ONE A DAY WOMEN'S PRENATAL DHA) 28 mg iron- 800 mcg Cmpk, Take 1 tablet by mouth daily., Disp: 90 each, Rfl: 4    BG monitoring goals: Fasting <95; 1 hour post start of meal <140. Test 4 x per day.  Check fasting a.m. ketones: No  GDM meal pattern/carb counting taught per guidelines: Yes    Past Goals:  Nutrition: GDM meal plan NOT MET   Activity: Walking after meals when able/staying active SOMETIMES MET   Monitoring: BG 4x/day as directed, ketones every morning SOMETIMES MET       New Goals:  Nutrition: GDM meal plan   Activity: Walking after meals when able/staying active   Monitoring: BG 4x/day as directed, ketones every morning    DIABETES CARE PLAN AND EDUCATION RECORD    Gestational Diabetes Disease Process/Preconception Care/Management During Pregnancy/Postpartum:Discussed    Meter (per above goals): Assessed and Discussed    Nutrition Management    Weight: Assessed and Discussed  Portions/Balance: Assessed and Discussed  Carb ID/Count: Assessed and Discussed  Label Reading: Assessed and Discussed  Menu Planning: Assessed and Discussed  Dining Out: Assessed and Discussed  Physical Activity: Assessed and Discussed    Acute Complications: Prevent, Detect, Treat:    Goal Setting and Problem Solving: Assessed and Discussed  Barriers: Assessed and Discussed  Psychosocial Adjustments: Assessed and Discussed

## 2021-07-14 PROBLEM — M54.50 LOW BACK PAIN DURING PREGNANCY: Status: RESOLVED | Noted: 2018-03-23 | Resolved: 2018-05-10

## 2021-07-14 PROBLEM — Z34.90 PREGNANT: Status: RESOLVED | Noted: 2018-04-23 | Resolved: 2018-05-10

## 2021-07-14 PROBLEM — Z34.90 PREGNANCY: Status: RESOLVED | Noted: 2018-04-05 | Resolved: 2018-05-10

## 2021-07-14 PROBLEM — O26.899 LOW BACK PAIN DURING PREGNANCY: Status: RESOLVED | Noted: 2018-03-23 | Resolved: 2018-05-10

## 2021-08-14 ENCOUNTER — HEALTH MAINTENANCE LETTER (OUTPATIENT)
Age: 36
End: 2021-08-14

## 2021-10-09 ENCOUNTER — HEALTH MAINTENANCE LETTER (OUTPATIENT)
Age: 36
End: 2021-10-09

## 2022-09-11 ENCOUNTER — HEALTH MAINTENANCE LETTER (OUTPATIENT)
Age: 37
End: 2022-09-11

## 2023-10-07 ENCOUNTER — HEALTH MAINTENANCE LETTER (OUTPATIENT)
Age: 38
End: 2023-10-07

## 2023-10-27 NOTE — PROGRESS NOTES
Chief Complaint   Patient presents with     pregnancy confirmation     took 4 pregnancy tests. all positive.         HPI: 31 y.o. year old female come in today for a pregnancy confirmation. LMP was 8/1/17. This would make her 6 weeks pregnant with ECD May 2018. Not having any spotting. She is having nausea and breast tenderness.  Was on metformin prior to getting pregnant, has stopped now that she is pregnant.  She is very excited about pregnancy, her next oldest child is 7 years old.    Objective:  /70 (Patient Site: Right Arm, Patient Position: Sitting, Cuff Size: Adult Regular)  Resp 16  LMP 07/31/2017   General: No apparent distress  UPT: Positive    Assessment:   Pregnancy confirmation    Plan:    Counseled patient on early pregnancy issues and plans for continued pregnancy:  - OTC meds safe in early pregnancy,  - importance of prenatal vitamins, and routine activities without restrictions.    - importance of ETOH abstinence and no other drug use  Informed of signs and symptoms of miscarriage and to call with questions of spotting/bleeding management and possible need to come in for evaluation before routine visit at 10-12 weeks  Twenty-five minutes spent with this patient, at least 50% in coordination of care or counseling reguarding the topics discussed in note.  Discussed information in the OB packet  Will let us know if early genetic testing wanted.    Leisa Aquino, CAR    This note has been dictated using voice recognition software. Any grammatical or context distortions are unintentional and inherent to the software     Alert and oriented to person, place, time/situation. normal mood and affect. no apparent risk to self or others.